# Patient Record
Sex: MALE | Race: WHITE | Employment: FULL TIME | URBAN - METROPOLITAN AREA
[De-identification: names, ages, dates, MRNs, and addresses within clinical notes are randomized per-mention and may not be internally consistent; named-entity substitution may affect disease eponyms.]

---

## 2018-03-14 VITALS
HEART RATE: 78 BPM | SYSTOLIC BLOOD PRESSURE: 130 MMHG | DIASTOLIC BLOOD PRESSURE: 74 MMHG | BODY MASS INDEX: 47.74 KG/M2 | HEIGHT: 68 IN | WEIGHT: 315 LBS | TEMPERATURE: 98.6 F

## 2018-03-14 RX ORDER — ATORVASTATIN CALCIUM 20 MG/1
20 TABLET, FILM COATED ORAL DAILY
COMMUNITY
End: 2018-03-16 | Stop reason: SDUPTHER

## 2018-03-16 ENCOUNTER — OFFICE VISIT (OUTPATIENT)
Dept: PRIMARY CARE CLINIC | Age: 48
End: 2018-03-16
Payer: COMMERCIAL

## 2018-03-16 VITALS
WEIGHT: 315 LBS | HEART RATE: 76 BPM | DIASTOLIC BLOOD PRESSURE: 84 MMHG | BODY MASS INDEX: 47.74 KG/M2 | TEMPERATURE: 96.8 F | SYSTOLIC BLOOD PRESSURE: 126 MMHG | HEIGHT: 68 IN

## 2018-03-16 DIAGNOSIS — K21.9 GASTROESOPHAGEAL REFLUX DISEASE, ESOPHAGITIS PRESENCE NOT SPECIFIED: ICD-10-CM

## 2018-03-16 DIAGNOSIS — E78.5 HYPERLIPIDEMIA, UNSPECIFIED HYPERLIPIDEMIA TYPE: Primary | ICD-10-CM

## 2018-03-16 DIAGNOSIS — F17.210 CIGARETTE SMOKER: ICD-10-CM

## 2018-03-16 DIAGNOSIS — E66.01 MORBID OBESITY (HCC): ICD-10-CM

## 2018-03-16 DIAGNOSIS — Z99.89 OSA ON CPAP: ICD-10-CM

## 2018-03-16 DIAGNOSIS — G47.33 OSA ON CPAP: ICD-10-CM

## 2018-03-16 PROCEDURE — 99213 OFFICE O/P EST LOW 20 MIN: CPT | Performed by: INTERNAL MEDICINE

## 2018-03-16 RX ORDER — ATORVASTATIN CALCIUM 20 MG/1
20 TABLET, FILM COATED ORAL DAILY
Qty: 90 TABLET | Refills: 1 | Status: SHIPPED | OUTPATIENT
Start: 2018-03-16 | End: 2019-06-06 | Stop reason: SDUPTHER

## 2018-03-16 RX ORDER — PANTOPRAZOLE SODIUM 40 MG/1
40 TABLET, DELAYED RELEASE ORAL DAILY
COMMUNITY
End: 2018-03-16 | Stop reason: SDUPTHER

## 2018-03-16 RX ORDER — PANTOPRAZOLE SODIUM 40 MG/1
40 TABLET, DELAYED RELEASE ORAL DAILY
Qty: 90 TABLET | Refills: 1 | Status: SHIPPED | OUTPATIENT
Start: 2018-03-16 | End: 2019-06-06 | Stop reason: SDUPTHER

## 2018-03-16 ASSESSMENT — ENCOUNTER SYMPTOMS
BLOOD IN STOOL: 0
ABDOMINAL PAIN: 0
BLURRED VISION: 0
NAUSEA: 0
HEMOPTYSIS: 0
EYE DISCHARGE: 0
ORTHOPNEA: 0
SHORTNESS OF BREATH: 0

## 2018-03-16 ASSESSMENT — PATIENT HEALTH QUESTIONNAIRE - PHQ9
2. FEELING DOWN, DEPRESSED OR HOPELESS: 0
SUM OF ALL RESPONSES TO PHQ9 QUESTIONS 1 & 2: 0
1. LITTLE INTEREST OR PLEASURE IN DOING THINGS: 0
SUM OF ALL RESPONSES TO PHQ QUESTIONS 1-9: 0

## 2018-03-16 NOTE — PATIENT INSTRUCTIONS
Advise for lab test prior to next visit    Low salt, Low Carb diet an low fat diet  Continue medications as advised and take them regularly  Regular exercises as advised    Discussed natural and expected course of this diagnosis and need to alert me if symptoms do not follow expected course, or if any worse. Smoking cessation if applicable, discussed with patient.

## 2018-03-16 NOTE — PROGRESS NOTES
Chief Complaint   Patient presents with    Hyperlipidemia     Here for recheck on Hyperlipidemia. Reports feeling well. Has concerns over finding blood on pillow. Thinks it was was his Lt ear. Denies any pain or discomfort. HPI:  Patient is here for follow-up . Shen to see Dr Jose Snyder later today  Allergy and Medications are reviewed and updated. Past Medical History, Surgical History, and Family History has been reviewed and updated. Review of Systems:  Review of Systems   Constitutional: Negative for chills and fever. HENT: Negative for congestion and ear pain. Eyes: Negative for blurred vision and discharge. Respiratory: Negative for hemoptysis and shortness of breath (No new SOB). Cardiovascular: Negative for chest pain, orthopnea and leg swelling. Gastrointestinal: Negative for abdominal pain, blood in stool and nausea. Genitourinary: Negative for flank pain and hematuria. Musculoskeletal: Negative for myalgias and neck pain. Skin: Negative for rash. Neurological: Negative for dizziness, focal weakness and seizures. Endo/Heme/Allergies: Negative for polydipsia. Does not bruise/bleed easily. Psychiatric/Behavioral: Negative for depression, hallucinations and suicidal ideas. Vitals:    03/16/18 1132   BP: 126/84   Pulse: 76   Temp: 96.8 °F (36 °C)   TempSrc: Temporal   Weight: (!) 375 lb (170.1 kg)   Height: 5' 8\" (1.727 m)       Physical Exam   Constitutional: He is oriented to person, place, and time. He appears well-developed and well-nourished. HENT:   Head: Normocephalic and atraumatic. Mouth/Throat: Oropharynx is clear and moist.   Eyes: Conjunctivae are normal. Pupils are equal, round, and reactive to light. Neck: Neck supple. No JVD present. Cardiovascular: Normal rate and regular rhythm. Pulmonary/Chest: Effort normal and breath sounds normal. He has no rales. Abdominal: Soft. Bowel sounds are normal.   Musculoskeletal: Normal range of motion. Lymphadenopathy:     He has no cervical adenopathy. Neurological: He is alert and oriented to person, place, and time. Skin: Skin is warm and dry. Psychiatric: His behavior is normal.       Labs :    Lab Results   Component Value Date    WBC 10.1 05/30/2017    HGB 14.5 05/30/2017    HCT 44.2 05/30/2017     05/30/2017    CHOL 157 05/30/2017    TRIG 115 05/30/2017    HDL 36 05/30/2017    ALT 24 05/30/2017    AST 18 05/30/2017     05/30/2017    K 4.8 05/30/2017     05/30/2017    CREATININE 0.9 05/30/2017    BUN 11 05/30/2017    CO2 24 05/30/2017    TSH 1.180 05/30/2017    LABA1C 5.6 05/30/2017     Lab Results   Component Value Date    COLORU Yellow 05/30/2017    NITRU Negative 05/30/2017    GLUCOSEU Negative 05/30/2017    GLUCOSEU NEGATIVE 12/16/2011    KETUA Negative 05/30/2017    UROBILINOGEN 0.2 05/30/2017    BILIRUBINUR Negative 05/30/2017    BILIRUBINUR NEGATIVE 12/16/2011             ASSESSMENT     Patient Active Problem List    Diagnosis Date Noted    Hyperlipidemia 03/16/2018    GERD (gastroesophageal reflux disease) 03/16/2018    Morbid obesity ([facility] [address] 03/16/2018    JEFFREY on CPAP 03/16/2018        Diagnosis:     ICD-10-CM ICD-9-CM    1. Hyperlipidemia, unspecified hyperlipidemia type E78.5 272.4 Comprehensive Metabolic Panel, Fasting      Lipid, Fasting      TSH without Reflex      Urinalysis with Microscopic   2. Gastroesophageal reflux disease, esophagitis presence not specified K21.9 530.81 CBC Auto Differential      Urinalysis with Microscopic   3. JEFFREY on CPAP G47.33 327.23     Z99.89 V46.8    4. Morbid obesity ([address] E66.01 278.01    5.  Cigarette smoker F17.210 305.1        PLAN:     Smoking Cessation       Patient Instructions   Advise for lab test prior to next visit    Low salt, Low Carb diet an low fat diet  Continue medications as advised and take them regularly  Regular exercises as advised    Discussed natural and expected course of this diagnosis and need to alert me if

## 2018-11-01 ENCOUNTER — HOSPITAL ENCOUNTER (OUTPATIENT)
Age: 48
Discharge: HOME | End: 2018-11-01
Payer: COMMERCIAL

## 2018-11-01 ENCOUNTER — HOSPITAL ENCOUNTER (OUTPATIENT)
Dept: GENERAL RADIOLOGY | Age: 48
Discharge: HOME | End: 2018-11-03
Payer: COMMERCIAL

## 2018-11-01 ENCOUNTER — HOSPITAL ENCOUNTER (OUTPATIENT)
Age: 48
Discharge: HOME | End: 2018-11-03
Payer: COMMERCIAL

## 2018-11-01 DIAGNOSIS — R06.09 DYSPNEA ON EXERTION: ICD-10-CM

## 2018-11-01 LAB
ALBUMIN SERPL-MCNC: 4.1 G/DL (ref 3.5–5.2)
ALP BLD-CCNC: 66 U/L (ref 40–129)
ALT SERPL-CCNC: 22 U/L (ref 0–40)
ANION GAP SERPL CALCULATED.3IONS-SCNC: 9 MMOL/L (ref 7–16)
AST SERPL-CCNC: 18 U/L (ref 0–39)
BILIRUB SERPL-MCNC: 0.4 MG/DL (ref 0–1.2)
BUN BLDV-MCNC: 12 MG/DL (ref 6–20)
C-REACTIVE PROTEIN, HIGH SENSITIVITY: 5.8 MG/L (ref 0–3)
CALCIUM SERPL-MCNC: 9.5 MG/DL (ref 8.6–10.2)
CHLORIDE BLD-SCNC: 104 MMOL/L (ref 98–107)
CHOLESTEROL, FASTING: 159 MG/DL (ref 0–199)
CO2: 28 MMOL/L (ref 22–29)
CREAT SERPL-MCNC: 1 MG/DL (ref 0.7–1.2)
GFR AFRICAN AMERICAN: >60
GFR NON-AFRICAN AMERICAN: >60 ML/MIN/1.73
GLUCOSE FASTING: 107 MG/DL (ref 74–109)
HCT VFR BLD CALC: 42.4 % (ref 37–54)
HDLC SERPL-MCNC: 41 MG/DL
HEMOGLOBIN: 14.4 G/DL (ref 12.5–16.5)
LDL CHOLESTEROL CALCULATED: 98 MG/DL (ref 0–99)
MCH RBC QN AUTO: 30 PG (ref 26–35)
MCHC RBC AUTO-ENTMCNC: 34 % (ref 32–34.5)
MCV RBC AUTO: 88.3 FL (ref 80–99.9)
PDW BLD-RTO: 12.5 FL (ref 11.5–15)
PLATELET # BLD: 196 E9/L (ref 130–450)
PMV BLD AUTO: 10.6 FL (ref 7–12)
POTASSIUM SERPL-SCNC: 4.7 MMOL/L (ref 3.5–5)
RBC # BLD: 4.8 E12/L (ref 3.8–5.8)
SODIUM BLD-SCNC: 141 MMOL/L (ref 132–146)
T3 UPTAKE PERCENT: 33.2 % (ref 22.5–37)
T4 TOTAL: 6.9 MCG/DL (ref 4.5–11.7)
TOTAL PROTEIN: 7.3 G/DL (ref 6.4–8.3)
TRIGLYCERIDE, FASTING: 99 MG/DL (ref 0–149)
TSH SERPL DL<=0.05 MIU/L-ACNC: 2.56 UIU/ML (ref 0.27–4.2)
VLDLC SERPL CALC-MCNC: 20 MG/DL
WBC # BLD: 10.5 E9/L (ref 4.5–11.5)

## 2018-11-01 PROCEDURE — 80053 COMPREHEN METABOLIC PANEL: CPT

## 2018-11-01 PROCEDURE — 84443 ASSAY THYROID STIM HORMONE: CPT

## 2018-11-01 PROCEDURE — 86141 C-REACTIVE PROTEIN HS: CPT

## 2018-11-01 PROCEDURE — 84479 ASSAY OF THYROID (T3 OR T4): CPT

## 2018-11-01 PROCEDURE — 36415 COLL VENOUS BLD VENIPUNCTURE: CPT

## 2018-11-01 PROCEDURE — 84436 ASSAY OF TOTAL THYROXINE: CPT

## 2018-11-01 PROCEDURE — 71046 X-RAY EXAM CHEST 2 VIEWS: CPT

## 2018-11-01 PROCEDURE — 80061 LIPID PANEL: CPT

## 2018-11-01 PROCEDURE — 85027 COMPLETE CBC AUTOMATED: CPT

## 2018-12-28 ENCOUNTER — OFFICE VISIT (OUTPATIENT)
Dept: PRIMARY CARE CLINIC | Age: 48
End: 2018-12-28
Payer: COMMERCIAL

## 2018-12-28 VITALS
WEIGHT: 315 LBS | OXYGEN SATURATION: 98 % | HEART RATE: 80 BPM | TEMPERATURE: 98.7 F | HEIGHT: 68 IN | BODY MASS INDEX: 47.74 KG/M2 | DIASTOLIC BLOOD PRESSURE: 76 MMHG | SYSTOLIC BLOOD PRESSURE: 134 MMHG

## 2018-12-28 DIAGNOSIS — E78.5 HYPERLIPIDEMIA, UNSPECIFIED HYPERLIPIDEMIA TYPE: Primary | ICD-10-CM

## 2018-12-28 DIAGNOSIS — E66.01 MORBID OBESITY (HCC): ICD-10-CM

## 2018-12-28 DIAGNOSIS — K21.9 GASTROESOPHAGEAL REFLUX DISEASE, ESOPHAGITIS PRESENCE NOT SPECIFIED: ICD-10-CM

## 2018-12-28 DIAGNOSIS — M54.40 MIDLINE LOW BACK PAIN WITH SCIATICA, SCIATICA LATERALITY UNSPECIFIED, UNSPECIFIED CHRONICITY: ICD-10-CM

## 2018-12-28 PROCEDURE — G8484 FLU IMMUNIZE NO ADMIN: HCPCS | Performed by: FAMILY MEDICINE

## 2018-12-28 PROCEDURE — 99213 OFFICE O/P EST LOW 20 MIN: CPT | Performed by: FAMILY MEDICINE

## 2018-12-28 PROCEDURE — 4004F PT TOBACCO SCREEN RCVD TLK: CPT | Performed by: FAMILY MEDICINE

## 2018-12-28 PROCEDURE — G8427 DOCREV CUR MEDS BY ELIG CLIN: HCPCS | Performed by: FAMILY MEDICINE

## 2018-12-28 PROCEDURE — G8417 CALC BMI ABV UP PARAM F/U: HCPCS | Performed by: FAMILY MEDICINE

## 2018-12-28 RX ORDER — CYCLOBENZAPRINE HCL 10 MG
10 TABLET ORAL NIGHTLY PRN
Qty: 30 TABLET | Refills: 0 | Status: SHIPPED | OUTPATIENT
Start: 2018-12-28 | End: 2019-01-07

## 2018-12-28 RX ORDER — NAPROXEN 500 MG/1
500 TABLET ORAL 2 TIMES DAILY WITH MEALS
Qty: 60 TABLET | Refills: 3 | Status: ON HOLD | OUTPATIENT
Start: 2018-12-28 | End: 2019-02-15 | Stop reason: HOSPADM

## 2018-12-28 ASSESSMENT — ENCOUNTER SYMPTOMS
GASTROINTESTINAL NEGATIVE: 1
ALLERGIC/IMMUNOLOGIC NEGATIVE: 1
EYES NEGATIVE: 1
RESPIRATORY NEGATIVE: 1
BACK PAIN: 1

## 2018-12-28 ASSESSMENT — PATIENT HEALTH QUESTIONNAIRE - PHQ9
1. LITTLE INTEREST OR PLEASURE IN DOING THINGS: 0
SUM OF ALL RESPONSES TO PHQ9 QUESTIONS 1 & 2: 0
SUM OF ALL RESPONSES TO PHQ QUESTIONS 1-9: 0
SUM OF ALL RESPONSES TO PHQ QUESTIONS 1-9: 0
2. FEELING DOWN, DEPRESSED OR HOPELESS: 0

## 2018-12-29 ENCOUNTER — HOSPITAL ENCOUNTER (OUTPATIENT)
Dept: GENERAL RADIOLOGY | Age: 48
Discharge: HOME | End: 2018-12-31
Payer: COMMERCIAL

## 2018-12-29 ENCOUNTER — HOSPITAL ENCOUNTER (OUTPATIENT)
Age: 48
Discharge: HOME | End: 2018-12-31
Payer: COMMERCIAL

## 2018-12-29 DIAGNOSIS — M54.40 MIDLINE LOW BACK PAIN WITH SCIATICA, SCIATICA LATERALITY UNSPECIFIED, UNSPECIFIED CHRONICITY: ICD-10-CM

## 2018-12-29 PROCEDURE — 72100 X-RAY EXAM L-S SPINE 2/3 VWS: CPT

## 2019-01-04 ENCOUNTER — OFFICE VISIT (OUTPATIENT)
Dept: PRIMARY CARE CLINIC | Age: 49
End: 2019-01-04
Payer: COMMERCIAL

## 2019-01-04 VITALS
WEIGHT: 315 LBS | SYSTOLIC BLOOD PRESSURE: 152 MMHG | HEART RATE: 78 BPM | OXYGEN SATURATION: 98 % | TEMPERATURE: 97.9 F | HEIGHT: 68 IN | BODY MASS INDEX: 47.74 KG/M2 | DIASTOLIC BLOOD PRESSURE: 84 MMHG

## 2019-01-04 DIAGNOSIS — F17.210 CIGARETTE SMOKER: ICD-10-CM

## 2019-01-04 DIAGNOSIS — E66.01 MORBID OBESITY (HCC): ICD-10-CM

## 2019-01-04 DIAGNOSIS — E78.5 HYPERLIPIDEMIA, UNSPECIFIED HYPERLIPIDEMIA TYPE: Primary | ICD-10-CM

## 2019-01-04 DIAGNOSIS — M54.40 MIDLINE LOW BACK PAIN WITH SCIATICA, SCIATICA LATERALITY UNSPECIFIED, UNSPECIFIED CHRONICITY: ICD-10-CM

## 2019-01-04 PROCEDURE — G8427 DOCREV CUR MEDS BY ELIG CLIN: HCPCS | Performed by: FAMILY MEDICINE

## 2019-01-04 PROCEDURE — G8484 FLU IMMUNIZE NO ADMIN: HCPCS | Performed by: FAMILY MEDICINE

## 2019-01-04 PROCEDURE — 4004F PT TOBACCO SCREEN RCVD TLK: CPT | Performed by: FAMILY MEDICINE

## 2019-01-04 PROCEDURE — G8417 CALC BMI ABV UP PARAM F/U: HCPCS | Performed by: FAMILY MEDICINE

## 2019-01-04 PROCEDURE — 99213 OFFICE O/P EST LOW 20 MIN: CPT | Performed by: FAMILY MEDICINE

## 2019-01-04 ASSESSMENT — PATIENT HEALTH QUESTIONNAIRE - PHQ9
1. LITTLE INTEREST OR PLEASURE IN DOING THINGS: 0
SUM OF ALL RESPONSES TO PHQ QUESTIONS 1-9: 0
SUM OF ALL RESPONSES TO PHQ9 QUESTIONS 1 & 2: 0
SUM OF ALL RESPONSES TO PHQ QUESTIONS 1-9: 0
2. FEELING DOWN, DEPRESSED OR HOPELESS: 0

## 2019-01-04 ASSESSMENT — ENCOUNTER SYMPTOMS
ALLERGIC/IMMUNOLOGIC NEGATIVE: 1
RESPIRATORY NEGATIVE: 1
BACK PAIN: 1
GASTROINTESTINAL NEGATIVE: 1
EYES NEGATIVE: 1

## 2019-01-17 ENCOUNTER — HOSPITAL ENCOUNTER (OUTPATIENT)
Dept: PHYSICAL THERAPY | Age: 49
Setting detail: THERAPIES SERIES
Discharge: HOME | End: 2019-01-17
Payer: COMMERCIAL

## 2019-01-17 PROCEDURE — G8978 MOBILITY CURRENT STATUS: HCPCS | Performed by: PHYSICAL THERAPIST

## 2019-01-17 PROCEDURE — G8979 MOBILITY GOAL STATUS: HCPCS | Performed by: PHYSICAL THERAPIST

## 2019-01-17 PROCEDURE — 97162 PT EVAL MOD COMPLEX 30 MIN: CPT | Performed by: PHYSICAL THERAPIST

## 2019-01-20 ENCOUNTER — HOSPITAL ENCOUNTER (EMERGENCY)
Age: 49
Discharge: OTHER INSTITUTION | End: 2019-01-20
Payer: COMMERCIAL

## 2019-01-20 ENCOUNTER — HOSPITAL ENCOUNTER (OUTPATIENT)
Age: 49
Discharge: HOME | End: 2019-01-20
Payer: COMMERCIAL

## 2019-01-20 ENCOUNTER — HOSPITAL ENCOUNTER (INPATIENT)
Age: 49
LOS: 4 days | Discharge: NURSING, REHAB OR LONG-TERM CARE | DRG: 552 | End: 2019-01-24
Attending: STUDENT IN AN ORGANIZED HEALTH CARE EDUCATION/TRAINING PROGRAM | Admitting: STUDENT IN AN ORGANIZED HEALTH CARE EDUCATION/TRAINING PROGRAM
Payer: COMMERCIAL

## 2019-01-20 ENCOUNTER — APPOINTMENT (OUTPATIENT)
Dept: CT IMAGING | Age: 49
End: 2019-01-20
Payer: COMMERCIAL

## 2019-01-20 VITALS
DIASTOLIC BLOOD PRESSURE: 74 MMHG | OXYGEN SATURATION: 98 % | HEART RATE: 76 BPM | RESPIRATION RATE: 16 BRPM | BODY MASS INDEX: 47.74 KG/M2 | TEMPERATURE: 98.3 F | SYSTOLIC BLOOD PRESSURE: 138 MMHG | WEIGHT: 315 LBS | HEIGHT: 68 IN

## 2019-01-20 DIAGNOSIS — G83.4 CAUDA EQUINA SYNDROME (HCC): Primary | ICD-10-CM

## 2019-01-20 PROCEDURE — 72131 CT LUMBAR SPINE W/O DYE: CPT

## 2019-01-20 PROCEDURE — A0425 GROUND MILEAGE: HCPCS

## 2019-01-20 PROCEDURE — 6360000002 HC RX W HCPCS: Performed by: NURSE PRACTITIONER

## 2019-01-20 PROCEDURE — 99284 EMERGENCY DEPT VISIT MOD MDM: CPT

## 2019-01-20 PROCEDURE — 6360000002 HC RX W HCPCS: Performed by: INTERNAL MEDICINE

## 2019-01-20 PROCEDURE — A0426 ALS 1: HCPCS

## 2019-01-20 PROCEDURE — 6370000000 HC RX 637 (ALT 250 FOR IP): Performed by: INTERNAL MEDICINE

## 2019-01-20 PROCEDURE — 2060000000 HC ICU INTERMEDIATE R&B

## 2019-01-20 PROCEDURE — 96372 THER/PROPH/DIAG INJ SC/IM: CPT

## 2019-01-20 RX ORDER — ATORVASTATIN CALCIUM 10 MG/1
10 TABLET, FILM COATED ORAL NIGHTLY
Status: DISCONTINUED | OUTPATIENT
Start: 2019-01-20 | End: 2019-01-24 | Stop reason: HOSPADM

## 2019-01-20 RX ORDER — ONDANSETRON 2 MG/ML
4 INJECTION INTRAMUSCULAR; INTRAVENOUS EVERY 6 HOURS PRN
Status: DISCONTINUED | OUTPATIENT
Start: 2019-01-20 | End: 2019-01-24 | Stop reason: HOSPADM

## 2019-01-20 RX ORDER — PANTOPRAZOLE SODIUM 40 MG/1
40 TABLET, DELAYED RELEASE ORAL NIGHTLY
Status: DISCONTINUED | OUTPATIENT
Start: 2019-01-20 | End: 2019-01-24 | Stop reason: HOSPADM

## 2019-01-20 RX ORDER — CYCLOBENZAPRINE HCL 10 MG
10 TABLET ORAL DAILY PRN
Status: ON HOLD | COMMUNITY
End: 2019-02-15 | Stop reason: HOSPADM

## 2019-01-20 RX ORDER — SODIUM CHLORIDE 0.9 % (FLUSH) 0.9 %
10 SYRINGE (ML) INJECTION PRN
Status: DISCONTINUED | OUTPATIENT
Start: 2019-01-20 | End: 2019-01-24 | Stop reason: HOSPADM

## 2019-01-20 RX ORDER — ACETAMINOPHEN 325 MG/1
650 TABLET ORAL EVERY 4 HOURS PRN
Status: DISCONTINUED | OUTPATIENT
Start: 2019-01-20 | End: 2019-01-24 | Stop reason: HOSPADM

## 2019-01-20 RX ORDER — DEXAMETHASONE SODIUM PHOSPHATE 10 MG/ML
10 INJECTION INTRAMUSCULAR; INTRAVENOUS ONCE
Status: COMPLETED | OUTPATIENT
Start: 2019-01-20 | End: 2019-01-20

## 2019-01-20 RX ORDER — ORPHENADRINE CITRATE 30 MG/ML
60 INJECTION INTRAMUSCULAR; INTRAVENOUS ONCE
Status: COMPLETED | OUTPATIENT
Start: 2019-01-20 | End: 2019-01-20

## 2019-01-20 RX ORDER — KETOROLAC TROMETHAMINE 30 MG/ML
30 INJECTION, SOLUTION INTRAMUSCULAR; INTRAVENOUS ONCE
Status: COMPLETED | OUTPATIENT
Start: 2019-01-20 | End: 2019-01-20

## 2019-01-20 RX ORDER — DOCUSATE SODIUM 100 MG/1
100 CAPSULE, LIQUID FILLED ORAL DAILY
Status: DISCONTINUED | OUTPATIENT
Start: 2019-01-20 | End: 2019-01-24 | Stop reason: HOSPADM

## 2019-01-20 RX ORDER — CYCLOBENZAPRINE HCL 10 MG
10 TABLET ORAL 2 TIMES DAILY PRN
Status: DISCONTINUED | OUTPATIENT
Start: 2019-01-20 | End: 2019-01-24 | Stop reason: HOSPADM

## 2019-01-20 RX ORDER — SODIUM CHLORIDE 0.9 % (FLUSH) 0.9 %
10 SYRINGE (ML) INJECTION EVERY 12 HOURS SCHEDULED
Status: DISCONTINUED | OUTPATIENT
Start: 2019-01-20 | End: 2019-01-24 | Stop reason: HOSPADM

## 2019-01-20 RX ADMIN — ORPHENADRINE CITRATE 60 MG: 60 INJECTION INTRAMUSCULAR; INTRAVENOUS at 13:41

## 2019-01-20 RX ADMIN — DOCUSATE SODIUM 100 MG: 100 CAPSULE, LIQUID FILLED ORAL at 21:29

## 2019-01-20 RX ADMIN — KETOROLAC TROMETHAMINE 30 MG: 30 INJECTION, SOLUTION INTRAMUSCULAR; INTRAVENOUS at 13:39

## 2019-01-20 RX ADMIN — PANTOPRAZOLE SODIUM 40 MG: 40 TABLET, DELAYED RELEASE ORAL at 23:19

## 2019-01-20 RX ADMIN — ATORVASTATIN CALCIUM 10 MG: 10 TABLET, FILM COATED ORAL at 23:19

## 2019-01-20 RX ADMIN — DEXAMETHASONE SODIUM PHOSPHATE 10 MG: 10 INJECTION INTRAMUSCULAR; INTRAVENOUS at 13:40

## 2019-01-20 RX ADMIN — ENOXAPARIN SODIUM 40 MG: 40 INJECTION SUBCUTANEOUS at 21:29

## 2019-01-20 ASSESSMENT — PAIN DESCRIPTION - PAIN TYPE
TYPE: ACUTE PAIN;CHRONIC PAIN
TYPE: ACUTE PAIN;CHRONIC PAIN

## 2019-01-20 ASSESSMENT — PAIN SCALES - GENERAL
PAINLEVEL_OUTOF10: 7
PAINLEVEL_OUTOF10: 7
PAINLEVEL_OUTOF10: 5
PAINLEVEL_OUTOF10: 5

## 2019-01-20 ASSESSMENT — PAIN DESCRIPTION - LOCATION: LOCATION: COCCYX

## 2019-01-21 ENCOUNTER — APPOINTMENT (OUTPATIENT)
Dept: MRI IMAGING | Age: 49
DRG: 552 | End: 2019-01-21
Attending: STUDENT IN AN ORGANIZED HEALTH CARE EDUCATION/TRAINING PROGRAM
Payer: COMMERCIAL

## 2019-01-21 ENCOUNTER — HOSPITAL ENCOUNTER (OUTPATIENT)
Dept: PHYSICAL THERAPY | Age: 49
Setting detail: THERAPIES SERIES
Discharge: HOME | End: 2019-01-21
Payer: COMMERCIAL

## 2019-01-21 LAB
ALBUMIN SERPL-MCNC: 4.1 G/DL (ref 3.5–5.2)
ALP BLD-CCNC: 58 U/L (ref 40–129)
ALT SERPL-CCNC: 18 U/L (ref 0–40)
ANION GAP SERPL CALCULATED.3IONS-SCNC: 15 MMOL/L (ref 7–16)
AST SERPL-CCNC: 19 U/L (ref 0–39)
BASOPHILS ABSOLUTE: 0.01 E9/L (ref 0–0.2)
BASOPHILS RELATIVE PERCENT: 0.1 % (ref 0–2)
BILIRUB SERPL-MCNC: 0.4 MG/DL (ref 0–1.2)
BILIRUBIN DIRECT: <0.2 MG/DL (ref 0–0.3)
BILIRUBIN, INDIRECT: NORMAL MG/DL (ref 0–1)
BUN BLDV-MCNC: 14 MG/DL (ref 6–20)
CALCIUM SERPL-MCNC: 9.6 MG/DL (ref 8.6–10.2)
CHLORIDE BLD-SCNC: 102 MMOL/L (ref 98–107)
CO2: 22 MMOL/L (ref 22–29)
CREAT SERPL-MCNC: 0.8 MG/DL (ref 0.7–1.2)
EOSINOPHILS ABSOLUTE: 0 E9/L (ref 0.05–0.5)
EOSINOPHILS RELATIVE PERCENT: 0 % (ref 0–6)
GFR AFRICAN AMERICAN: >60
GFR NON-AFRICAN AMERICAN: >60 ML/MIN/1.73
GLUCOSE BLD-MCNC: 147 MG/DL (ref 74–99)
HCT VFR BLD CALC: 41.8 % (ref 37–54)
HEMOGLOBIN: 14.2 G/DL (ref 12.5–16.5)
IMMATURE GRANULOCYTES #: 0.09 E9/L
IMMATURE GRANULOCYTES %: 0.7 % (ref 0–5)
LYMPHOCYTES ABSOLUTE: 0.91 E9/L (ref 1.5–4)
LYMPHOCYTES RELATIVE PERCENT: 7.6 % (ref 20–42)
MCH RBC QN AUTO: 29.3 PG (ref 26–35)
MCHC RBC AUTO-ENTMCNC: 34 % (ref 32–34.5)
MCV RBC AUTO: 86.4 FL (ref 80–99.9)
MONOCYTES ABSOLUTE: 0.55 E9/L (ref 0.1–0.95)
MONOCYTES RELATIVE PERCENT: 4.6 % (ref 2–12)
NEUTROPHILS ABSOLUTE: 10.46 E9/L (ref 1.8–7.3)
NEUTROPHILS RELATIVE PERCENT: 87 % (ref 43–80)
PDW BLD-RTO: 12.3 FL (ref 11.5–15)
PLATELET # BLD: 216 E9/L (ref 130–450)
PMV BLD AUTO: 10.8 FL (ref 7–12)
POTASSIUM SERPL-SCNC: 4.3 MMOL/L (ref 3.5–5)
RBC # BLD: 4.84 E12/L (ref 3.8–5.8)
SODIUM BLD-SCNC: 139 MMOL/L (ref 132–146)
TOTAL PROTEIN: 7.1 G/DL (ref 6.4–8.3)
WBC # BLD: 12 E9/L (ref 4.5–11.5)

## 2019-01-21 PROCEDURE — 99222 1ST HOSP IP/OBS MODERATE 55: CPT | Performed by: NEUROLOGICAL SURGERY

## 2019-01-21 PROCEDURE — 6360000004 HC RX CONTRAST MEDICATION: Performed by: RADIOLOGY

## 2019-01-21 PROCEDURE — 36415 COLL VENOUS BLD VENIPUNCTURE: CPT

## 2019-01-21 PROCEDURE — 6370000000 HC RX 637 (ALT 250 FOR IP): Performed by: FAMILY MEDICINE

## 2019-01-21 PROCEDURE — 72141 MRI NECK SPINE W/O DYE: CPT

## 2019-01-21 PROCEDURE — 85025 COMPLETE CBC W/AUTO DIFF WBC: CPT

## 2019-01-21 PROCEDURE — 2060000000 HC ICU INTERMEDIATE R&B

## 2019-01-21 PROCEDURE — A9579 GAD-BASE MR CONTRAST NOS,1ML: HCPCS | Performed by: RADIOLOGY

## 2019-01-21 PROCEDURE — 2580000003 HC RX 258: Performed by: INTERNAL MEDICINE

## 2019-01-21 PROCEDURE — 80076 HEPATIC FUNCTION PANEL: CPT

## 2019-01-21 PROCEDURE — 80048 BASIC METABOLIC PNL TOTAL CA: CPT

## 2019-01-21 PROCEDURE — 6370000000 HC RX 637 (ALT 250 FOR IP): Performed by: INTERNAL MEDICINE

## 2019-01-21 PROCEDURE — 72158 MRI LUMBAR SPINE W/O & W/DYE: CPT

## 2019-01-21 RX ORDER — NAPROXEN 500 MG/1
500 TABLET ORAL 2 TIMES DAILY WITH MEALS
Status: DISCONTINUED | OUTPATIENT
Start: 2019-01-21 | End: 2019-01-24 | Stop reason: HOSPADM

## 2019-01-21 RX ADMIN — CYCLOBENZAPRINE 10 MG: 10 TABLET, FILM COATED ORAL at 09:03

## 2019-01-21 RX ADMIN — PANTOPRAZOLE SODIUM 40 MG: 40 TABLET, DELAYED RELEASE ORAL at 20:26

## 2019-01-21 RX ADMIN — NAPROXEN 500 MG: 500 TABLET ORAL at 18:32

## 2019-01-21 RX ADMIN — ATORVASTATIN CALCIUM 10 MG: 10 TABLET, FILM COATED ORAL at 20:26

## 2019-01-21 RX ADMIN — CYCLOBENZAPRINE 10 MG: 10 TABLET, FILM COATED ORAL at 20:26

## 2019-01-21 RX ADMIN — DOCUSATE SODIUM 100 MG: 100 CAPSULE, LIQUID FILLED ORAL at 09:04

## 2019-01-21 RX ADMIN — Medication 10 ML: at 09:05

## 2019-01-21 RX ADMIN — GADOTERIDOL 20 ML: 279.3 INJECTION, SOLUTION INTRAVENOUS at 13:20

## 2019-01-21 ASSESSMENT — PAIN DESCRIPTION - DESCRIPTORS: DESCRIPTORS: ACHING;SORE;DULL

## 2019-01-21 ASSESSMENT — PAIN SCALES - GENERAL
PAINLEVEL_OUTOF10: 5
PAINLEVEL_OUTOF10: 0
PAINLEVEL_OUTOF10: 7
PAINLEVEL_OUTOF10: 0

## 2019-01-21 ASSESSMENT — PAIN DESCRIPTION - LOCATION: LOCATION: BACK

## 2019-01-21 ASSESSMENT — PAIN DESCRIPTION - PROGRESSION: CLINICAL_PROGRESSION: NOT CHANGED

## 2019-01-21 ASSESSMENT — PAIN DESCRIPTION - FREQUENCY: FREQUENCY: CONTINUOUS

## 2019-01-21 ASSESSMENT — PAIN DESCRIPTION - PAIN TYPE: TYPE: ACUTE PAIN

## 2019-01-21 ASSESSMENT — PAIN DESCRIPTION - ONSET: ONSET: ON-GOING

## 2019-01-22 PROBLEM — M48.061 SPINAL STENOSIS OF LUMBAR REGION WITHOUT NEUROGENIC CLAUDICATION: Status: ACTIVE | Noted: 2019-01-22

## 2019-01-22 LAB
ANION GAP SERPL CALCULATED.3IONS-SCNC: 13 MMOL/L (ref 7–16)
BASOPHILS ABSOLUTE: 0.03 E9/L (ref 0–0.2)
BASOPHILS RELATIVE PERCENT: 0.2 % (ref 0–2)
BUN BLDV-MCNC: 16 MG/DL (ref 6–20)
CALCIUM SERPL-MCNC: 9.2 MG/DL (ref 8.6–10.2)
CHLORIDE BLD-SCNC: 103 MMOL/L (ref 98–107)
CO2: 24 MMOL/L (ref 22–29)
CREAT SERPL-MCNC: 0.9 MG/DL (ref 0.7–1.2)
EOSINOPHILS ABSOLUTE: 0.06 E9/L (ref 0.05–0.5)
EOSINOPHILS RELATIVE PERCENT: 0.4 % (ref 0–6)
GFR AFRICAN AMERICAN: >60
GFR NON-AFRICAN AMERICAN: >60 ML/MIN/1.73
GLUCOSE BLD-MCNC: 100 MG/DL (ref 74–99)
HCT VFR BLD CALC: 42.3 % (ref 37–54)
HEMOGLOBIN: 14.2 G/DL (ref 12.5–16.5)
IMMATURE GRANULOCYTES #: 0.08 E9/L
IMMATURE GRANULOCYTES %: 0.6 % (ref 0–5)
LYMPHOCYTES ABSOLUTE: 3.07 E9/L (ref 1.5–4)
LYMPHOCYTES RELATIVE PERCENT: 22.5 % (ref 20–42)
MCH RBC QN AUTO: 29.3 PG (ref 26–35)
MCHC RBC AUTO-ENTMCNC: 33.6 % (ref 32–34.5)
MCV RBC AUTO: 87.4 FL (ref 80–99.9)
MONOCYTES ABSOLUTE: 1.08 E9/L (ref 0.1–0.95)
MONOCYTES RELATIVE PERCENT: 7.9 % (ref 2–12)
NEUTROPHILS ABSOLUTE: 9.32 E9/L (ref 1.8–7.3)
NEUTROPHILS RELATIVE PERCENT: 68.4 % (ref 43–80)
PDW BLD-RTO: 12.9 FL (ref 11.5–15)
PLATELET # BLD: 208 E9/L (ref 130–450)
PMV BLD AUTO: 11.1 FL (ref 7–12)
POTASSIUM SERPL-SCNC: 4.1 MMOL/L (ref 3.5–5)
PROCALCITONIN: 0.06 NG/ML (ref 0–0.08)
RBC # BLD: 4.84 E12/L (ref 3.8–5.8)
SODIUM BLD-SCNC: 140 MMOL/L (ref 132–146)
WBC # BLD: 13.6 E9/L (ref 4.5–11.5)

## 2019-01-22 PROCEDURE — 6370000000 HC RX 637 (ALT 250 FOR IP): Performed by: FAMILY MEDICINE

## 2019-01-22 PROCEDURE — 97530 THERAPEUTIC ACTIVITIES: CPT

## 2019-01-22 PROCEDURE — 97166 OT EVAL MOD COMPLEX 45 MIN: CPT

## 2019-01-22 PROCEDURE — 97162 PT EVAL MOD COMPLEX 30 MIN: CPT | Performed by: PHYSICAL THERAPIST

## 2019-01-22 PROCEDURE — 99232 SBSQ HOSP IP/OBS MODERATE 35: CPT | Performed by: NEUROLOGICAL SURGERY

## 2019-01-22 PROCEDURE — 6360000002 HC RX W HCPCS: Performed by: INTERNAL MEDICINE

## 2019-01-22 PROCEDURE — 85025 COMPLETE CBC W/AUTO DIFF WBC: CPT

## 2019-01-22 PROCEDURE — 36415 COLL VENOUS BLD VENIPUNCTURE: CPT

## 2019-01-22 PROCEDURE — 2060000000 HC ICU INTERMEDIATE R&B

## 2019-01-22 PROCEDURE — 84145 PROCALCITONIN (PCT): CPT

## 2019-01-22 PROCEDURE — 2580000003 HC RX 258: Performed by: INTERNAL MEDICINE

## 2019-01-22 PROCEDURE — 6370000000 HC RX 637 (ALT 250 FOR IP): Performed by: INTERNAL MEDICINE

## 2019-01-22 PROCEDURE — 80048 BASIC METABOLIC PNL TOTAL CA: CPT

## 2019-01-22 PROCEDURE — 97530 THERAPEUTIC ACTIVITIES: CPT | Performed by: PHYSICAL THERAPIST

## 2019-01-22 RX ADMIN — NAPROXEN 500 MG: 500 TABLET ORAL at 07:26

## 2019-01-22 RX ADMIN — NAPROXEN 500 MG: 500 TABLET ORAL at 16:41

## 2019-01-22 RX ADMIN — CYCLOBENZAPRINE 10 MG: 10 TABLET, FILM COATED ORAL at 20:04

## 2019-01-22 RX ADMIN — ENOXAPARIN SODIUM 40 MG: 40 INJECTION SUBCUTANEOUS at 10:39

## 2019-01-22 RX ADMIN — CYCLOBENZAPRINE 10 MG: 10 TABLET, FILM COATED ORAL at 07:26

## 2019-01-22 RX ADMIN — DOCUSATE SODIUM 100 MG: 100 CAPSULE, LIQUID FILLED ORAL at 07:26

## 2019-01-22 RX ADMIN — ACETAMINOPHEN 650 MG: 325 TABLET ORAL at 14:37

## 2019-01-22 RX ADMIN — ATORVASTATIN CALCIUM 10 MG: 10 TABLET, FILM COATED ORAL at 20:04

## 2019-01-22 RX ADMIN — Medication 10 ML: at 20:04

## 2019-01-22 RX ADMIN — PANTOPRAZOLE SODIUM 40 MG: 40 TABLET, DELAYED RELEASE ORAL at 20:04

## 2019-01-22 RX ADMIN — ACETAMINOPHEN 650 MG: 325 TABLET ORAL at 07:26

## 2019-01-22 RX ADMIN — Medication 10 ML: at 07:26

## 2019-01-22 ASSESSMENT — PAIN SCALES - GENERAL
PAINLEVEL_OUTOF10: 7
PAINLEVEL_OUTOF10: 6
PAINLEVEL_OUTOF10: 0
PAINLEVEL_OUTOF10: 6

## 2019-01-22 ASSESSMENT — PAIN DESCRIPTION - LOCATION: LOCATION: BACK

## 2019-01-22 ASSESSMENT — PAIN DESCRIPTION - DESCRIPTORS: DESCRIPTORS: ACHING;DISCOMFORT

## 2019-01-22 ASSESSMENT — PAIN DESCRIPTION - PAIN TYPE: TYPE: ACUTE PAIN

## 2019-01-23 LAB
ANION GAP SERPL CALCULATED.3IONS-SCNC: 12 MMOL/L (ref 7–16)
BASOPHILS ABSOLUTE: 0.05 E9/L (ref 0–0.2)
BASOPHILS RELATIVE PERCENT: 0.5 % (ref 0–2)
BUN BLDV-MCNC: 15 MG/DL (ref 6–20)
CALCIUM SERPL-MCNC: 9 MG/DL (ref 8.6–10.2)
CHLORIDE BLD-SCNC: 102 MMOL/L (ref 98–107)
CO2: 25 MMOL/L (ref 22–29)
CREAT SERPL-MCNC: 0.9 MG/DL (ref 0.7–1.2)
EOSINOPHILS ABSOLUTE: 0.17 E9/L (ref 0.05–0.5)
EOSINOPHILS RELATIVE PERCENT: 1.7 % (ref 0–6)
GFR AFRICAN AMERICAN: >60
GFR NON-AFRICAN AMERICAN: >60 ML/MIN/1.73
GLUCOSE BLD-MCNC: 100 MG/DL (ref 74–99)
HCT VFR BLD CALC: 40.8 % (ref 37–54)
HEMOGLOBIN: 13.7 G/DL (ref 12.5–16.5)
IMMATURE GRANULOCYTES #: 0.05 E9/L
IMMATURE GRANULOCYTES %: 0.5 % (ref 0–5)
LYMPHOCYTES ABSOLUTE: 3.71 E9/L (ref 1.5–4)
LYMPHOCYTES RELATIVE PERCENT: 36.2 % (ref 20–42)
MCH RBC QN AUTO: 29.8 PG (ref 26–35)
MCHC RBC AUTO-ENTMCNC: 33.6 % (ref 32–34.5)
MCV RBC AUTO: 88.9 FL (ref 80–99.9)
MONOCYTES ABSOLUTE: 0.91 E9/L (ref 0.1–0.95)
MONOCYTES RELATIVE PERCENT: 8.9 % (ref 2–12)
NEUTROPHILS ABSOLUTE: 5.36 E9/L (ref 1.8–7.3)
NEUTROPHILS RELATIVE PERCENT: 52.2 % (ref 43–80)
PDW BLD-RTO: 13 FL (ref 11.5–15)
PLATELET # BLD: 183 E9/L (ref 130–450)
PMV BLD AUTO: 10.9 FL (ref 7–12)
POTASSIUM SERPL-SCNC: 4.2 MMOL/L (ref 3.5–5)
RBC # BLD: 4.59 E12/L (ref 3.8–5.8)
SODIUM BLD-SCNC: 139 MMOL/L (ref 132–146)
WBC # BLD: 10.3 E9/L (ref 4.5–11.5)

## 2019-01-23 PROCEDURE — 6370000000 HC RX 637 (ALT 250 FOR IP): Performed by: INTERNAL MEDICINE

## 2019-01-23 PROCEDURE — 6370000000 HC RX 637 (ALT 250 FOR IP): Performed by: FAMILY MEDICINE

## 2019-01-23 PROCEDURE — 80048 BASIC METABOLIC PNL TOTAL CA: CPT

## 2019-01-23 PROCEDURE — 2580000003 HC RX 258: Performed by: INTERNAL MEDICINE

## 2019-01-23 PROCEDURE — 36415 COLL VENOUS BLD VENIPUNCTURE: CPT

## 2019-01-23 PROCEDURE — 97535 SELF CARE MNGMENT TRAINING: CPT

## 2019-01-23 PROCEDURE — 2060000000 HC ICU INTERMEDIATE R&B

## 2019-01-23 PROCEDURE — 85025 COMPLETE CBC W/AUTO DIFF WBC: CPT

## 2019-01-23 PROCEDURE — 97530 THERAPEUTIC ACTIVITIES: CPT

## 2019-01-23 PROCEDURE — 6360000002 HC RX W HCPCS: Performed by: INTERNAL MEDICINE

## 2019-01-23 RX ADMIN — ATORVASTATIN CALCIUM 10 MG: 10 TABLET, FILM COATED ORAL at 20:56

## 2019-01-23 RX ADMIN — Medication 10 ML: at 20:56

## 2019-01-23 RX ADMIN — ENOXAPARIN SODIUM 40 MG: 40 INJECTION SUBCUTANEOUS at 09:06

## 2019-01-23 RX ADMIN — PANTOPRAZOLE SODIUM 40 MG: 40 TABLET, DELAYED RELEASE ORAL at 20:56

## 2019-01-23 RX ADMIN — Medication 10 ML: at 09:06

## 2019-01-23 RX ADMIN — NAPROXEN 500 MG: 500 TABLET ORAL at 17:26

## 2019-01-23 RX ADMIN — CYCLOBENZAPRINE 10 MG: 10 TABLET, FILM COATED ORAL at 17:26

## 2019-01-23 RX ADMIN — NAPROXEN 500 MG: 500 TABLET ORAL at 09:06

## 2019-01-23 RX ADMIN — DOCUSATE SODIUM 100 MG: 100 CAPSULE, LIQUID FILLED ORAL at 09:06

## 2019-01-23 ASSESSMENT — PAIN DESCRIPTION - LOCATION: LOCATION: NECK

## 2019-01-23 ASSESSMENT — PAIN DESCRIPTION - FREQUENCY: FREQUENCY: CONTINUOUS

## 2019-01-23 ASSESSMENT — PAIN DESCRIPTION - DESCRIPTORS: DESCRIPTORS: ACHING;SORE;DISCOMFORT

## 2019-01-23 ASSESSMENT — PAIN SCALES - GENERAL
PAINLEVEL_OUTOF10: 5
PAINLEVEL_OUTOF10: 3
PAINLEVEL_OUTOF10: 0

## 2019-01-23 ASSESSMENT — PAIN DESCRIPTION - PAIN TYPE: TYPE: ACUTE PAIN

## 2019-01-23 ASSESSMENT — PAIN DESCRIPTION - ONSET: ONSET: ON-GOING

## 2019-01-23 ASSESSMENT — PAIN DESCRIPTION - PROGRESSION: CLINICAL_PROGRESSION: NOT CHANGED

## 2019-01-24 VITALS
TEMPERATURE: 98.1 F | HEIGHT: 68 IN | WEIGHT: 315 LBS | HEART RATE: 80 BPM | BODY MASS INDEX: 47.74 KG/M2 | RESPIRATION RATE: 18 BRPM | OXYGEN SATURATION: 94 % | DIASTOLIC BLOOD PRESSURE: 78 MMHG | SYSTOLIC BLOOD PRESSURE: 132 MMHG

## 2019-01-24 LAB
ANION GAP SERPL CALCULATED.3IONS-SCNC: 12 MMOL/L (ref 7–16)
BASOPHILS ABSOLUTE: 0.06 E9/L (ref 0–0.2)
BASOPHILS RELATIVE PERCENT: 0.6 % (ref 0–2)
BUN BLDV-MCNC: 16 MG/DL (ref 6–20)
CALCIUM SERPL-MCNC: 9.1 MG/DL (ref 8.6–10.2)
CHLORIDE BLD-SCNC: 102 MMOL/L (ref 98–107)
CO2: 25 MMOL/L (ref 22–29)
CREAT SERPL-MCNC: 1 MG/DL (ref 0.7–1.2)
EOSINOPHILS ABSOLUTE: 0.2 E9/L (ref 0.05–0.5)
EOSINOPHILS RELATIVE PERCENT: 1.9 % (ref 0–6)
GFR AFRICAN AMERICAN: >60
GFR NON-AFRICAN AMERICAN: >60 ML/MIN/1.73
GLUCOSE BLD-MCNC: 101 MG/DL (ref 74–99)
HCT VFR BLD CALC: 42.6 % (ref 37–54)
HEMOGLOBIN: 14.3 G/DL (ref 12.5–16.5)
IMMATURE GRANULOCYTES #: 0.04 E9/L
IMMATURE GRANULOCYTES %: 0.4 % (ref 0–5)
LYMPHOCYTES ABSOLUTE: 2.97 E9/L (ref 1.5–4)
LYMPHOCYTES RELATIVE PERCENT: 28.3 % (ref 20–42)
MCH RBC QN AUTO: 30 PG (ref 26–35)
MCHC RBC AUTO-ENTMCNC: 33.6 % (ref 32–34.5)
MCV RBC AUTO: 89.3 FL (ref 80–99.9)
MONOCYTES ABSOLUTE: 1 E9/L (ref 0.1–0.95)
MONOCYTES RELATIVE PERCENT: 9.5 % (ref 2–12)
NEUTROPHILS ABSOLUTE: 6.21 E9/L (ref 1.8–7.3)
NEUTROPHILS RELATIVE PERCENT: 59.3 % (ref 43–80)
PDW BLD-RTO: 12.7 FL (ref 11.5–15)
PLATELET # BLD: 189 E9/L (ref 130–450)
PMV BLD AUTO: 10.8 FL (ref 7–12)
POTASSIUM SERPL-SCNC: 4.6 MMOL/L (ref 3.5–5)
RBC # BLD: 4.77 E12/L (ref 3.8–5.8)
SODIUM BLD-SCNC: 139 MMOL/L (ref 132–146)
WBC # BLD: 10.5 E9/L (ref 4.5–11.5)

## 2019-01-24 PROCEDURE — 6370000000 HC RX 637 (ALT 250 FOR IP): Performed by: FAMILY MEDICINE

## 2019-01-24 PROCEDURE — 2580000003 HC RX 258: Performed by: INTERNAL MEDICINE

## 2019-01-24 PROCEDURE — 97530 THERAPEUTIC ACTIVITIES: CPT

## 2019-01-24 PROCEDURE — 97535 SELF CARE MNGMENT TRAINING: CPT

## 2019-01-24 PROCEDURE — 6370000000 HC RX 637 (ALT 250 FOR IP): Performed by: INTERNAL MEDICINE

## 2019-01-24 PROCEDURE — 85025 COMPLETE CBC W/AUTO DIFF WBC: CPT

## 2019-01-24 PROCEDURE — 80048 BASIC METABOLIC PNL TOTAL CA: CPT

## 2019-01-24 PROCEDURE — 36415 COLL VENOUS BLD VENIPUNCTURE: CPT

## 2019-01-24 PROCEDURE — 6360000002 HC RX W HCPCS: Performed by: INTERNAL MEDICINE

## 2019-01-24 RX ADMIN — ENOXAPARIN SODIUM 40 MG: 40 INJECTION SUBCUTANEOUS at 09:18

## 2019-01-24 RX ADMIN — NAPROXEN 500 MG: 500 TABLET ORAL at 09:17

## 2019-01-24 RX ADMIN — NAPROXEN 500 MG: 500 TABLET ORAL at 16:51

## 2019-01-24 RX ADMIN — NAPROXEN 500 MG: 500 TABLET ORAL at 16:50

## 2019-01-24 RX ADMIN — DOCUSATE SODIUM 100 MG: 100 CAPSULE, LIQUID FILLED ORAL at 09:17

## 2019-01-24 RX ADMIN — Medication 10 ML: at 09:17

## 2019-01-24 ASSESSMENT — PAIN DESCRIPTION - PROGRESSION: CLINICAL_PROGRESSION: NOT CHANGED

## 2019-01-24 ASSESSMENT — PAIN SCALES - GENERAL
PAINLEVEL_OUTOF10: 5

## 2019-01-24 ASSESSMENT — PAIN DESCRIPTION - DESCRIPTORS: DESCRIPTORS: ACHING;SORE;DISCOMFORT

## 2019-01-24 ASSESSMENT — PAIN DESCRIPTION - PAIN TYPE: TYPE: ACUTE PAIN

## 2019-01-24 ASSESSMENT — PAIN DESCRIPTION - LOCATION: LOCATION: NECK

## 2019-01-24 ASSESSMENT — PAIN DESCRIPTION - ONSET: ONSET: ON-GOING

## 2019-01-24 ASSESSMENT — PAIN DESCRIPTION - FREQUENCY: FREQUENCY: CONTINUOUS

## 2019-01-25 ENCOUNTER — HOSPITAL ENCOUNTER (OUTPATIENT)
Dept: PHYSICAL THERAPY | Age: 49
Setting detail: THERAPIES SERIES
Discharge: HOME | End: 2019-01-25
Payer: COMMERCIAL

## 2019-01-25 ENCOUNTER — TELEPHONE (OUTPATIENT)
Dept: PRIMARY CARE CLINIC | Age: 49
End: 2019-01-25

## 2019-02-07 ENCOUNTER — OFFICE VISIT (OUTPATIENT)
Dept: NEUROSURGERY | Age: 49
End: 2019-02-07
Payer: COMMERCIAL

## 2019-02-07 VITALS
HEIGHT: 70 IN | SYSTOLIC BLOOD PRESSURE: 139 MMHG | WEIGHT: 315 LBS | DIASTOLIC BLOOD PRESSURE: 83 MMHG | HEART RATE: 75 BPM | BODY MASS INDEX: 45.1 KG/M2

## 2019-02-07 DIAGNOSIS — G82.50 QUADRIPLEGIA (HCC): Primary | ICD-10-CM

## 2019-02-07 DIAGNOSIS — G95.9 CERVICAL MYELOPATHY (HCC): ICD-10-CM

## 2019-02-07 DIAGNOSIS — G82.50 QUADRIPARESIS (HCC): Primary | ICD-10-CM

## 2019-02-07 DIAGNOSIS — M50.20 CERVICAL HERNIATED DISC: ICD-10-CM

## 2019-02-07 PROCEDURE — 99213 OFFICE O/P EST LOW 20 MIN: CPT | Performed by: NEUROLOGICAL SURGERY

## 2019-02-07 PROCEDURE — 4004F PT TOBACCO SCREEN RCVD TLK: CPT | Performed by: NEUROLOGICAL SURGERY

## 2019-02-07 PROCEDURE — G8417 CALC BMI ABV UP PARAM F/U: HCPCS | Performed by: NEUROLOGICAL SURGERY

## 2019-02-07 PROCEDURE — G8484 FLU IMMUNIZE NO ADMIN: HCPCS | Performed by: NEUROLOGICAL SURGERY

## 2019-02-07 PROCEDURE — G8427 DOCREV CUR MEDS BY ELIG CLIN: HCPCS | Performed by: NEUROLOGICAL SURGERY

## 2019-02-08 ENCOUNTER — PREP FOR PROCEDURE (OUTPATIENT)
Dept: NEUROSURGERY | Age: 49
End: 2019-02-08

## 2019-02-08 DIAGNOSIS — M50.20 HERNIATION OF CERVICAL INTERVERTEBRAL DISC: Primary | ICD-10-CM

## 2019-02-08 RX ORDER — SODIUM CHLORIDE 0.9 % (FLUSH) 0.9 %
10 SYRINGE (ML) INJECTION PRN
Status: CANCELLED | OUTPATIENT
Start: 2019-02-08

## 2019-02-08 RX ORDER — SODIUM CHLORIDE 9 MG/ML
INJECTION, SOLUTION INTRAVENOUS CONTINUOUS
Status: CANCELLED | OUTPATIENT
Start: 2019-02-08

## 2019-02-08 RX ORDER — SODIUM CHLORIDE 0.9 % (FLUSH) 0.9 %
10 SYRINGE (ML) INJECTION EVERY 12 HOURS SCHEDULED
Status: CANCELLED | OUTPATIENT
Start: 2019-02-08

## 2019-02-11 RX ORDER — LANOLIN ALCOHOL/MO/W.PET/CERES
3 CREAM (GRAM) TOPICAL NIGHTLY PRN
COMMUNITY
End: 2019-06-06

## 2019-02-11 RX ORDER — HYDROCORTISONE 0.5 %
CREAM (GRAM) TOPICAL 3 TIMES DAILY PRN
COMMUNITY
End: 2019-06-06

## 2019-02-11 RX ORDER — BISACODYL 10 MG
10 SUPPOSITORY, RECTAL RECTAL DAILY PRN
COMMUNITY
End: 2020-02-21

## 2019-02-11 RX ORDER — ACETAMINOPHEN 325 MG/1
650 TABLET ORAL EVERY 6 HOURS PRN
COMMUNITY
End: 2021-12-10

## 2019-02-13 ENCOUNTER — ANESTHESIA EVENT (OUTPATIENT)
Dept: OPERATING ROOM | Age: 49
End: 2019-02-13
Payer: COMMERCIAL

## 2019-02-14 ENCOUNTER — APPOINTMENT (OUTPATIENT)
Dept: GENERAL RADIOLOGY | Age: 49
End: 2019-02-14
Attending: NEUROLOGICAL SURGERY
Payer: COMMERCIAL

## 2019-02-14 ENCOUNTER — ANESTHESIA (OUTPATIENT)
Dept: OPERATING ROOM | Age: 49
End: 2019-02-14
Payer: COMMERCIAL

## 2019-02-14 ENCOUNTER — HOSPITAL ENCOUNTER (OUTPATIENT)
Age: 49
Setting detail: OBSERVATION
Discharge: NURSING, REHAB OR LONG-TERM CARE | End: 2019-02-15
Attending: NEUROLOGICAL SURGERY | Admitting: NEUROLOGICAL SURGERY
Payer: COMMERCIAL

## 2019-02-14 VITALS — OXYGEN SATURATION: 100 % | SYSTOLIC BLOOD PRESSURE: 144 MMHG | DIASTOLIC BLOOD PRESSURE: 89 MMHG | TEMPERATURE: 97 F

## 2019-02-14 DIAGNOSIS — M50.20 HERNIATION OF CERVICAL INTERVERTEBRAL DISC: ICD-10-CM

## 2019-02-14 DIAGNOSIS — G95.9 MYELOPATHY (HCC): ICD-10-CM

## 2019-02-14 LAB
ABO/RH: NORMAL
ANTIBODY SCREEN: NORMAL

## 2019-02-14 PROCEDURE — C1713 ANCHOR/SCREW BN/BN,TIS/BN: HCPCS | Performed by: NEUROLOGICAL SURGERY

## 2019-02-14 PROCEDURE — 3600000004 HC SURGERY LEVEL 4 BASE: Performed by: NEUROLOGICAL SURGERY

## 2019-02-14 PROCEDURE — 86900 BLOOD TYPING SEROLOGIC ABO: CPT

## 2019-02-14 PROCEDURE — 36415 COLL VENOUS BLD VENIPUNCTURE: CPT

## 2019-02-14 PROCEDURE — G0378 HOSPITAL OBSERVATION PER HR: HCPCS

## 2019-02-14 PROCEDURE — 2580000003 HC RX 258: Performed by: PHYSICIAN ASSISTANT

## 2019-02-14 PROCEDURE — 6360000002 HC RX W HCPCS: Performed by: PHYSICIAN ASSISTANT

## 2019-02-14 PROCEDURE — 6360000002 HC RX W HCPCS: Performed by: ANESTHESIOLOGY

## 2019-02-14 PROCEDURE — 6370000000 HC RX 637 (ALT 250 FOR IP): Performed by: PHYSICIAN ASSISTANT

## 2019-02-14 PROCEDURE — 6360000002 HC RX W HCPCS: Performed by: REGISTERED NURSE

## 2019-02-14 PROCEDURE — 20931 SP BONE ALGRFT STRUCT ADD-ON: CPT | Performed by: NEUROLOGICAL SURGERY

## 2019-02-14 PROCEDURE — 2580000003 HC RX 258: Performed by: NEUROLOGICAL SURGERY

## 2019-02-14 PROCEDURE — 88304 TISSUE EXAM BY PATHOLOGIST: CPT

## 2019-02-14 PROCEDURE — 2720000010 HC SURG SUPPLY STERILE: Performed by: NEUROLOGICAL SURGERY

## 2019-02-14 PROCEDURE — 22551 ARTHRD ANT NTRBDY CERVICAL: CPT | Performed by: NEUROLOGICAL SURGERY

## 2019-02-14 PROCEDURE — 7100000000 HC PACU RECOVERY - FIRST 15 MIN: Performed by: NEUROLOGICAL SURGERY

## 2019-02-14 PROCEDURE — 3700000000 HC ANESTHESIA ATTENDED CARE: Performed by: NEUROLOGICAL SURGERY

## 2019-02-14 PROCEDURE — 3600000014 HC SURGERY LEVEL 4 ADDTL 15MIN: Performed by: NEUROLOGICAL SURGERY

## 2019-02-14 PROCEDURE — 2709999900 HC NON-CHARGEABLE SUPPLY: Performed by: NEUROLOGICAL SURGERY

## 2019-02-14 PROCEDURE — 22845 INSERT SPINE FIXATION DEVICE: CPT | Performed by: NEUROLOGICAL SURGERY

## 2019-02-14 PROCEDURE — 86901 BLOOD TYPING SEROLOGIC RH(D): CPT

## 2019-02-14 PROCEDURE — 3209999900 FLUORO FOR SURGICAL PROCEDURES

## 2019-02-14 PROCEDURE — 6360000002 HC RX W HCPCS: Performed by: NEUROLOGICAL SURGERY

## 2019-02-14 PROCEDURE — 7100000001 HC PACU RECOVERY - ADDTL 15 MIN: Performed by: NEUROLOGICAL SURGERY

## 2019-02-14 PROCEDURE — 2500000003 HC RX 250 WO HCPCS: Performed by: REGISTERED NURSE

## 2019-02-14 PROCEDURE — 22845 INSERT SPINE FIXATION DEVICE: CPT | Performed by: PHYSICIAN ASSISTANT

## 2019-02-14 PROCEDURE — 2780000010 HC IMPLANT OTHER: Performed by: NEUROLOGICAL SURGERY

## 2019-02-14 PROCEDURE — 3700000001 HC ADD 15 MINUTES (ANESTHESIA): Performed by: NEUROLOGICAL SURGERY

## 2019-02-14 PROCEDURE — 2500000003 HC RX 250 WO HCPCS: Performed by: NEUROLOGICAL SURGERY

## 2019-02-14 PROCEDURE — 86850 RBC ANTIBODY SCREEN: CPT

## 2019-02-14 PROCEDURE — 22551 ARTHRD ANT NTRBDY CERVICAL: CPT | Performed by: PHYSICIAN ASSISTANT

## 2019-02-14 DEVICE — XTEND ANTERIOR CERVICAL PLATE, 1-LEVEL, 12MM
Type: IMPLANTABLE DEVICE | Site: NECK | Status: FUNCTIONAL
Brand: XTEND

## 2019-02-14 DEVICE — GRAFT BNE SUB W12XH8XL14MM CANC CORT ANT CERV INTBDY FUS: Type: IMPLANTABLE DEVICE | Site: NECK | Status: FUNCTIONAL

## 2019-02-14 DEVICE — XTEND 4.2MM VARIABLE ANGLE SCREW, SELF-DRILLING, 16MM
Type: IMPLANTABLE DEVICE | Site: NECK | Status: FUNCTIONAL
Brand: XTEND

## 2019-02-14 RX ORDER — DOCUSATE SODIUM 100 MG/1
100 CAPSULE, LIQUID FILLED ORAL 2 TIMES DAILY
Status: DISCONTINUED | OUTPATIENT
Start: 2019-02-14 | End: 2019-02-15 | Stop reason: HOSPADM

## 2019-02-14 RX ORDER — OXYCODONE HYDROCHLORIDE AND ACETAMINOPHEN 5; 325 MG/1; MG/1
2 TABLET ORAL PRN
Status: DISCONTINUED | OUTPATIENT
Start: 2019-02-14 | End: 2019-02-14

## 2019-02-14 RX ORDER — PANTOPRAZOLE SODIUM 40 MG/1
40 TABLET, DELAYED RELEASE ORAL DAILY
Status: DISCONTINUED | OUTPATIENT
Start: 2019-02-14 | End: 2019-02-15 | Stop reason: HOSPADM

## 2019-02-14 RX ORDER — OXYCODONE HYDROCHLORIDE AND ACETAMINOPHEN 5; 325 MG/1; MG/1
2 TABLET ORAL EVERY 4 HOURS PRN
Status: DISCONTINUED | OUTPATIENT
Start: 2019-02-14 | End: 2019-02-15 | Stop reason: HOSPADM

## 2019-02-14 RX ORDER — ONDANSETRON 2 MG/ML
4 INJECTION INTRAMUSCULAR; INTRAVENOUS
Status: DISCONTINUED | OUTPATIENT
Start: 2019-02-14 | End: 2019-02-14 | Stop reason: HOSPADM

## 2019-02-14 RX ORDER — SODIUM CHLORIDE 0.9 % (FLUSH) 0.9 %
10 SYRINGE (ML) INJECTION PRN
Status: DISCONTINUED | OUTPATIENT
Start: 2019-02-14 | End: 2019-02-15 | Stop reason: HOSPADM

## 2019-02-14 RX ORDER — FENTANYL CITRATE 50 UG/ML
INJECTION, SOLUTION INTRAMUSCULAR; INTRAVENOUS PRN
Status: DISCONTINUED | OUTPATIENT
Start: 2019-02-14 | End: 2019-02-14 | Stop reason: SDUPTHER

## 2019-02-14 RX ORDER — SODIUM CHLORIDE 0.9 % (FLUSH) 0.9 %
10 SYRINGE (ML) INJECTION EVERY 12 HOURS SCHEDULED
Status: DISCONTINUED | OUTPATIENT
Start: 2019-02-14 | End: 2019-02-15 | Stop reason: HOSPADM

## 2019-02-14 RX ORDER — DEXAMETHASONE SODIUM PHOSPHATE 10 MG/ML
INJECTION, SOLUTION INTRAMUSCULAR; INTRAVENOUS PRN
Status: DISCONTINUED | OUTPATIENT
Start: 2019-02-14 | End: 2019-02-14 | Stop reason: SDUPTHER

## 2019-02-14 RX ORDER — ONDANSETRON 2 MG/ML
4 INJECTION INTRAMUSCULAR; INTRAVENOUS EVERY 6 HOURS PRN
Status: DISCONTINUED | OUTPATIENT
Start: 2019-02-14 | End: 2019-02-15 | Stop reason: HOSPADM

## 2019-02-14 RX ORDER — MORPHINE SULFATE 2 MG/ML
1 INJECTION, SOLUTION INTRAMUSCULAR; INTRAVENOUS EVERY 5 MIN PRN
Status: DISCONTINUED | OUTPATIENT
Start: 2019-02-14 | End: 2019-02-14 | Stop reason: HOSPADM

## 2019-02-14 RX ORDER — ACETAMINOPHEN 325 MG/1
650 TABLET ORAL EVERY 4 HOURS PRN
Status: DISCONTINUED | OUTPATIENT
Start: 2019-02-14 | End: 2019-02-15 | Stop reason: HOSPADM

## 2019-02-14 RX ORDER — CYCLOBENZAPRINE HCL 10 MG
10 TABLET ORAL 3 TIMES DAILY PRN
Status: DISCONTINUED | OUTPATIENT
Start: 2019-02-14 | End: 2019-02-15 | Stop reason: HOSPADM

## 2019-02-14 RX ORDER — ATORVASTATIN CALCIUM 20 MG/1
20 TABLET, FILM COATED ORAL DAILY
Status: DISCONTINUED | OUTPATIENT
Start: 2019-02-14 | End: 2019-02-15 | Stop reason: HOSPADM

## 2019-02-14 RX ORDER — OXYCODONE HYDROCHLORIDE AND ACETAMINOPHEN 5; 325 MG/1; MG/1
1 TABLET ORAL PRN
Status: DISCONTINUED | OUTPATIENT
Start: 2019-02-14 | End: 2019-02-14

## 2019-02-14 RX ORDER — OXYCODONE HYDROCHLORIDE AND ACETAMINOPHEN 5; 325 MG/1; MG/1
1 TABLET ORAL EVERY 4 HOURS PRN
Status: DISCONTINUED | OUTPATIENT
Start: 2019-02-14 | End: 2019-02-15 | Stop reason: HOSPADM

## 2019-02-14 RX ORDER — SODIUM CHLORIDE 9 MG/ML
INJECTION, SOLUTION INTRAVENOUS CONTINUOUS
Status: DISCONTINUED | OUTPATIENT
Start: 2019-02-14 | End: 2019-02-15 | Stop reason: HOSPADM

## 2019-02-14 RX ORDER — BISACODYL 10 MG
10 SUPPOSITORY, RECTAL RECTAL DAILY PRN
Status: DISCONTINUED | OUTPATIENT
Start: 2019-02-14 | End: 2019-02-15 | Stop reason: HOSPADM

## 2019-02-14 RX ORDER — SUCCINYLCHOLINE CHLORIDE 20 MG/ML
INJECTION INTRAMUSCULAR; INTRAVENOUS PRN
Status: DISCONTINUED | OUTPATIENT
Start: 2019-02-14 | End: 2019-02-14 | Stop reason: SDUPTHER

## 2019-02-14 RX ORDER — MORPHINE SULFATE 2 MG/ML
2 INJECTION, SOLUTION INTRAMUSCULAR; INTRAVENOUS EVERY 5 MIN PRN
Status: DISCONTINUED | OUTPATIENT
Start: 2019-02-14 | End: 2019-02-14 | Stop reason: HOSPADM

## 2019-02-14 RX ORDER — MORPHINE SULFATE 2 MG/ML
2 INJECTION, SOLUTION INTRAMUSCULAR; INTRAVENOUS
Status: DISCONTINUED | OUTPATIENT
Start: 2019-02-14 | End: 2019-02-15 | Stop reason: HOSPADM

## 2019-02-14 RX ORDER — MORPHINE SULFATE 4 MG/ML
4 INJECTION, SOLUTION INTRAMUSCULAR; INTRAVENOUS
Status: DISCONTINUED | OUTPATIENT
Start: 2019-02-14 | End: 2019-02-15 | Stop reason: HOSPADM

## 2019-02-14 RX ORDER — SODIUM CHLORIDE 9 MG/ML
INJECTION, SOLUTION INTRAVENOUS CONTINUOUS
Status: DISCONTINUED | OUTPATIENT
Start: 2019-02-14 | End: 2019-02-14

## 2019-02-14 RX ORDER — VANCOMYCIN HYDROCHLORIDE 500 MG/10ML
INJECTION, POWDER, LYOPHILIZED, FOR SOLUTION INTRAVENOUS PRN
Status: DISCONTINUED | OUTPATIENT
Start: 2019-02-14 | End: 2019-02-14 | Stop reason: HOSPADM

## 2019-02-14 RX ORDER — NEOSTIGMINE METHYLSULFATE 1 MG/ML
INJECTION, SOLUTION INTRAVENOUS PRN
Status: DISCONTINUED | OUTPATIENT
Start: 2019-02-14 | End: 2019-02-14 | Stop reason: SDUPTHER

## 2019-02-14 RX ORDER — ONDANSETRON 2 MG/ML
INJECTION INTRAMUSCULAR; INTRAVENOUS PRN
Status: DISCONTINUED | OUTPATIENT
Start: 2019-02-14 | End: 2019-02-14 | Stop reason: SDUPTHER

## 2019-02-14 RX ORDER — PROPOFOL 10 MG/ML
INJECTION, EMULSION INTRAVENOUS PRN
Status: DISCONTINUED | OUTPATIENT
Start: 2019-02-14 | End: 2019-02-14 | Stop reason: SDUPTHER

## 2019-02-14 RX ORDER — SODIUM CHLORIDE 0.9 % (FLUSH) 0.9 %
10 SYRINGE (ML) INJECTION EVERY 12 HOURS SCHEDULED
Status: DISCONTINUED | OUTPATIENT
Start: 2019-02-14 | End: 2019-02-14

## 2019-02-14 RX ORDER — ACETAMINOPHEN 325 MG/1
650 TABLET ORAL EVERY 6 HOURS PRN
Status: DISCONTINUED | OUTPATIENT
Start: 2019-02-14 | End: 2019-02-14 | Stop reason: SDUPTHER

## 2019-02-14 RX ORDER — MIDAZOLAM HYDROCHLORIDE 1 MG/ML
INJECTION INTRAMUSCULAR; INTRAVENOUS PRN
Status: DISCONTINUED | OUTPATIENT
Start: 2019-02-14 | End: 2019-02-14 | Stop reason: SDUPTHER

## 2019-02-14 RX ORDER — ROCURONIUM BROMIDE 10 MG/ML
INJECTION, SOLUTION INTRAVENOUS PRN
Status: DISCONTINUED | OUTPATIENT
Start: 2019-02-14 | End: 2019-02-14 | Stop reason: SDUPTHER

## 2019-02-14 RX ORDER — MEPERIDINE HYDROCHLORIDE 50 MG/ML
12.5 INJECTION INTRAMUSCULAR; INTRAVENOUS; SUBCUTANEOUS
Status: DISCONTINUED | OUTPATIENT
Start: 2019-02-14 | End: 2019-02-14 | Stop reason: HOSPADM

## 2019-02-14 RX ORDER — GLYCOPYRROLATE 1 MG/5 ML
SYRINGE (ML) INTRAVENOUS PRN
Status: DISCONTINUED | OUTPATIENT
Start: 2019-02-14 | End: 2019-02-14 | Stop reason: SDUPTHER

## 2019-02-14 RX ORDER — SODIUM CHLORIDE 0.9 % (FLUSH) 0.9 %
10 SYRINGE (ML) INJECTION PRN
Status: DISCONTINUED | OUTPATIENT
Start: 2019-02-14 | End: 2019-02-14

## 2019-02-14 RX ADMIN — SUCCINYLCHOLINE CHLORIDE 180 MG: 20 INJECTION, SOLUTION INTRAMUSCULAR; INTRAVENOUS at 10:18

## 2019-02-14 RX ADMIN — PROPOFOL 200 MG: 10 INJECTION, EMULSION INTRAVENOUS at 10:18

## 2019-02-14 RX ADMIN — SODIUM CHLORIDE: 9 INJECTION, SOLUTION INTRAVENOUS at 13:20

## 2019-02-14 RX ADMIN — HYDROMORPHONE HYDROCHLORIDE 0.5 MG: 1 INJECTION, SOLUTION INTRAMUSCULAR; INTRAVENOUS; SUBCUTANEOUS at 12:40

## 2019-02-14 RX ADMIN — ONDANSETRON HYDROCHLORIDE 4 MG: 2 INJECTION, SOLUTION INTRAMUSCULAR; INTRAVENOUS at 11:37

## 2019-02-14 RX ADMIN — Medication 10 ML: at 09:15

## 2019-02-14 RX ADMIN — FENTANYL CITRATE 50 MCG: 50 INJECTION, SOLUTION INTRAMUSCULAR; INTRAVENOUS at 11:45

## 2019-02-14 RX ADMIN — CEFAZOLIN SODIUM 3 G: 10 INJECTION, POWDER, FOR SOLUTION INTRAVENOUS at 19:39

## 2019-02-14 RX ADMIN — FENTANYL CITRATE 100 MCG: 50 INJECTION, SOLUTION INTRAMUSCULAR; INTRAVENOUS at 10:18

## 2019-02-14 RX ADMIN — OXYCODONE AND ACETAMINOPHEN 1 TABLET: 5; 325 TABLET ORAL at 21:21

## 2019-02-14 RX ADMIN — OXYCODONE AND ACETAMINOPHEN 1 TABLET: 5; 325 TABLET ORAL at 15:26

## 2019-02-14 RX ADMIN — Medication 10 ML: at 22:14

## 2019-02-14 RX ADMIN — DEXAMETHASONE SODIUM PHOSPHATE 10 MG: 10 INJECTION INTRAMUSCULAR; INTRAVENOUS at 10:18

## 2019-02-14 RX ADMIN — MIDAZOLAM HYDROCHLORIDE 2 MG: 1 INJECTION, SOLUTION INTRAMUSCULAR; INTRAVENOUS at 10:16

## 2019-02-14 RX ADMIN — CYCLOBENZAPRINE HYDROCHLORIDE 10 MG: 10 TABLET, FILM COATED ORAL at 21:21

## 2019-02-14 RX ADMIN — DEXTROSE MONOHYDRATE 3 G: 50 INJECTION, SOLUTION INTRAVENOUS at 10:27

## 2019-02-14 RX ADMIN — ATORVASTATIN CALCIUM 20 MG: 20 TABLET, FILM COATED ORAL at 21:26

## 2019-02-14 RX ADMIN — SODIUM CHLORIDE: 9 INJECTION, SOLUTION INTRAVENOUS at 09:11

## 2019-02-14 RX ADMIN — DOCUSATE SODIUM 100 MG: 100 CAPSULE, LIQUID FILLED ORAL at 21:20

## 2019-02-14 RX ADMIN — Medication 0.6 MG: at 11:45

## 2019-02-14 RX ADMIN — ROCURONIUM BROMIDE 50 MG: 10 INJECTION INTRAVENOUS at 10:22

## 2019-02-14 RX ADMIN — DOCUSATE SODIUM 100 MG: 100 CAPSULE, LIQUID FILLED ORAL at 14:44

## 2019-02-14 RX ADMIN — HYDROMORPHONE HYDROCHLORIDE 0.5 MG: 1 INJECTION, SOLUTION INTRAMUSCULAR; INTRAVENOUS; SUBCUTANEOUS at 12:30

## 2019-02-14 RX ADMIN — Medication 3 MG: at 11:45

## 2019-02-14 ASSESSMENT — PULMONARY FUNCTION TESTS
PIF_VALUE: 31
PIF_VALUE: 3
PIF_VALUE: 35
PIF_VALUE: 13
PIF_VALUE: 31
PIF_VALUE: 2
PIF_VALUE: 31
PIF_VALUE: 4
PIF_VALUE: 31
PIF_VALUE: 35
PIF_VALUE: 31
PIF_VALUE: 4
PIF_VALUE: 30
PIF_VALUE: 26
PIF_VALUE: 31
PIF_VALUE: 31
PIF_VALUE: 38
PIF_VALUE: 31
PIF_VALUE: 31
PIF_VALUE: 2
PIF_VALUE: 36
PIF_VALUE: 31
PIF_VALUE: 27
PIF_VALUE: 31
PIF_VALUE: 39
PIF_VALUE: 37
PIF_VALUE: 31
PIF_VALUE: 3
PIF_VALUE: 31
PIF_VALUE: 29
PIF_VALUE: 31
PIF_VALUE: 2
PIF_VALUE: 31
PIF_VALUE: 31
PIF_VALUE: 30
PIF_VALUE: 31
PIF_VALUE: 27
PIF_VALUE: 31
PIF_VALUE: 31
PIF_VALUE: 1
PIF_VALUE: 31
PIF_VALUE: 32
PIF_VALUE: 31
PIF_VALUE: 4
PIF_VALUE: 40
PIF_VALUE: 31
PIF_VALUE: 29
PIF_VALUE: 31
PIF_VALUE: 31
PIF_VALUE: 38
PIF_VALUE: 31
PIF_VALUE: 3
PIF_VALUE: 31
PIF_VALUE: 55
PIF_VALUE: 31
PIF_VALUE: 40
PIF_VALUE: 6
PIF_VALUE: 34
PIF_VALUE: 31
PIF_VALUE: 20
PIF_VALUE: 31
PIF_VALUE: 36

## 2019-02-14 ASSESSMENT — PAIN DESCRIPTION - PAIN TYPE
TYPE: SURGICAL PAIN

## 2019-02-14 ASSESSMENT — PAIN DESCRIPTION - DESCRIPTORS
DESCRIPTORS: ACHING;PRESSURE;STABBING
DESCRIPTORS: ACHING;CRAMPING
DESCRIPTORS: DULL;ACHING
DESCRIPTORS: DULL;ACHING

## 2019-02-14 ASSESSMENT — PAIN DESCRIPTION - ORIENTATION
ORIENTATION: POSTERIOR

## 2019-02-14 ASSESSMENT — PAIN SCALES - GENERAL
PAINLEVEL_OUTOF10: 7
PAINLEVEL_OUTOF10: 5
PAINLEVEL_OUTOF10: 4
PAINLEVEL_OUTOF10: 6
PAINLEVEL_OUTOF10: 3
PAINLEVEL_OUTOF10: 7
PAINLEVEL_OUTOF10: 3
PAINLEVEL_OUTOF10: 0

## 2019-02-14 ASSESSMENT — PAIN DESCRIPTION - DIRECTION
RADIATING_TOWARDS: SHOULDERS

## 2019-02-14 ASSESSMENT — PAIN DESCRIPTION - LOCATION
LOCATION: NECK

## 2019-02-14 ASSESSMENT — PAIN - FUNCTIONAL ASSESSMENT: PAIN_FUNCTIONAL_ASSESSMENT: 0-10

## 2019-02-15 VITALS
OXYGEN SATURATION: 94 % | TEMPERATURE: 97.6 F | RESPIRATION RATE: 16 BRPM | BODY MASS INDEX: 45.1 KG/M2 | WEIGHT: 315 LBS | HEART RATE: 74 BPM | SYSTOLIC BLOOD PRESSURE: 119 MMHG | HEIGHT: 70 IN | DIASTOLIC BLOOD PRESSURE: 65 MMHG

## 2019-02-15 PROCEDURE — 6360000002 HC RX W HCPCS: Performed by: PHYSICIAN ASSISTANT

## 2019-02-15 PROCEDURE — 2580000003 HC RX 258: Performed by: PHYSICIAN ASSISTANT

## 2019-02-15 PROCEDURE — G0378 HOSPITAL OBSERVATION PER HR: HCPCS

## 2019-02-15 PROCEDURE — 97162 PT EVAL MOD COMPLEX 30 MIN: CPT

## 2019-02-15 PROCEDURE — 6370000000 HC RX 637 (ALT 250 FOR IP): Performed by: PHYSICIAN ASSISTANT

## 2019-02-15 PROCEDURE — 97165 OT EVAL LOW COMPLEX 30 MIN: CPT

## 2019-02-15 PROCEDURE — 97530 THERAPEUTIC ACTIVITIES: CPT

## 2019-02-15 RX ORDER — CYCLOBENZAPRINE HCL 10 MG
10 TABLET ORAL 3 TIMES DAILY PRN
Qty: 40 TABLET | Refills: 0 | Status: SHIPPED | OUTPATIENT
Start: 2019-02-15 | End: 2019-02-25

## 2019-02-15 RX ORDER — OXYCODONE HYDROCHLORIDE AND ACETAMINOPHEN 5; 325 MG/1; MG/1
1 TABLET ORAL EVERY 4 HOURS PRN
Qty: 42 TABLET | Refills: 0 | Status: SHIPPED | OUTPATIENT
Start: 2019-02-15 | End: 2019-02-22

## 2019-02-15 RX ADMIN — OXYCODONE AND ACETAMINOPHEN 1 TABLET: 5; 325 TABLET ORAL at 01:25

## 2019-02-15 RX ADMIN — CEFAZOLIN SODIUM 3 G: 10 INJECTION, POWDER, FOR SOLUTION INTRAVENOUS at 03:18

## 2019-02-15 RX ADMIN — PANTOPRAZOLE SODIUM 40 MG: 40 TABLET, DELAYED RELEASE ORAL at 08:58

## 2019-02-15 RX ADMIN — OXYCODONE AND ACETAMINOPHEN 1 TABLET: 5; 325 TABLET ORAL at 09:03

## 2019-02-15 RX ADMIN — DOCUSATE SODIUM 100 MG: 100 CAPSULE, LIQUID FILLED ORAL at 08:58

## 2019-02-15 ASSESSMENT — PAIN DESCRIPTION - PAIN TYPE: TYPE: SURGICAL PAIN

## 2019-02-15 ASSESSMENT — PAIN SCALES - GENERAL
PAINLEVEL_OUTOF10: 7
PAINLEVEL_OUTOF10: 2
PAINLEVEL_OUTOF10: 6
PAINLEVEL_OUTOF10: 3
PAINLEVEL_OUTOF10: 5

## 2019-02-15 ASSESSMENT — PAIN DESCRIPTION - DESCRIPTORS: DESCRIPTORS: ACHING;DISCOMFORT

## 2019-02-15 ASSESSMENT — PAIN DESCRIPTION - LOCATION: LOCATION: NECK;SHOULDER

## 2019-02-18 ENCOUNTER — TELEPHONE (OUTPATIENT)
Dept: PRIMARY CARE CLINIC | Age: 49
End: 2019-02-18

## 2019-02-18 ENCOUNTER — TELEPHONE (OUTPATIENT)
Dept: NEUROSURGERY | Age: 49
End: 2019-02-18

## 2019-02-19 ENCOUNTER — TELEPHONE (OUTPATIENT)
Dept: NEUROSURGERY | Age: 49
End: 2019-02-19

## 2019-03-13 ENCOUNTER — HOSPITAL ENCOUNTER (OUTPATIENT)
Dept: GENERAL RADIOLOGY | Age: 49
Discharge: HOME | End: 2019-03-15
Payer: COMMERCIAL

## 2019-03-13 ENCOUNTER — HOSPITAL ENCOUNTER (OUTPATIENT)
Age: 49
Discharge: HOME | End: 2019-03-15
Payer: COMMERCIAL

## 2019-03-13 DIAGNOSIS — G95.9 CERVICAL MYELOPATHY (HCC): ICD-10-CM

## 2019-03-13 DIAGNOSIS — G82.50 QUADRIPARESIS (HCC): ICD-10-CM

## 2019-03-13 DIAGNOSIS — M50.20 CERVICAL HERNIATED DISC: ICD-10-CM

## 2019-03-13 PROCEDURE — 72040 X-RAY EXAM NECK SPINE 2-3 VW: CPT

## 2019-03-14 ENCOUNTER — OFFICE VISIT (OUTPATIENT)
Dept: NEUROSURGERY | Age: 49
End: 2019-03-14

## 2019-03-14 VITALS
SYSTOLIC BLOOD PRESSURE: 144 MMHG | HEIGHT: 69 IN | BODY MASS INDEX: 46.65 KG/M2 | DIASTOLIC BLOOD PRESSURE: 82 MMHG | HEART RATE: 77 BPM | WEIGHT: 315 LBS

## 2019-03-14 DIAGNOSIS — M50.90 CERVICAL DISC DISEASE: Primary | ICD-10-CM

## 2019-03-14 PROCEDURE — 99024 POSTOP FOLLOW-UP VISIT: CPT | Performed by: PHYSICIAN ASSISTANT

## 2019-04-04 ENCOUNTER — TELEPHONE (OUTPATIENT)
Dept: NEUROSURGERY | Age: 49
End: 2019-04-04

## 2019-04-04 DIAGNOSIS — Z98.1 S/P CERVICAL SPINAL FUSION: Primary | ICD-10-CM

## 2019-05-07 ENCOUNTER — HOSPITAL ENCOUNTER (OUTPATIENT)
Age: 49
Discharge: HOME | End: 2019-05-09
Payer: COMMERCIAL

## 2019-05-07 ENCOUNTER — HOSPITAL ENCOUNTER (OUTPATIENT)
Dept: GENERAL RADIOLOGY | Age: 49
Discharge: HOME | End: 2019-05-09
Payer: COMMERCIAL

## 2019-05-07 DIAGNOSIS — Z98.1 S/P CERVICAL SPINAL FUSION: ICD-10-CM

## 2019-05-07 PROCEDURE — 72050 X-RAY EXAM NECK SPINE 4/5VWS: CPT

## 2019-05-09 ENCOUNTER — OFFICE VISIT (OUTPATIENT)
Dept: NEUROSURGERY | Age: 49
End: 2019-05-09

## 2019-05-09 VITALS
HEIGHT: 69 IN | SYSTOLIC BLOOD PRESSURE: 129 MMHG | DIASTOLIC BLOOD PRESSURE: 75 MMHG | BODY MASS INDEX: 46.65 KG/M2 | WEIGHT: 315 LBS | HEART RATE: 89 BPM

## 2019-05-09 DIAGNOSIS — M54.12 CERVICAL RADICULOPATHY: Primary | ICD-10-CM

## 2019-05-09 PROCEDURE — 99024 POSTOP FOLLOW-UP VISIT: CPT | Performed by: PHYSICIAN ASSISTANT

## 2019-05-09 RX ORDER — M-VIT,TX,IRON,MINS/CALC/FOLIC 27MG-0.4MG
1 TABLET ORAL DAILY
COMMUNITY
End: 2020-11-02

## 2019-05-09 NOTE — PROGRESS NOTES
Post Operative Follow-up    Patient is status post:  Cervical fusion. estuardo op sited pain well. No arm pain. Hand numbness improving. Ambulating with at walker. Good bladder control    Physical Exam  Alert and Oriented X 3  PERRLA, EOMI  BOND 5/5  Wound: C/D/I    A/P: patient is s/p C5-6 ACDF. Doing well. X-rays stable. No restrictions. F/u 9 months.

## 2019-06-06 ENCOUNTER — OFFICE VISIT (OUTPATIENT)
Dept: FAMILY MEDICINE CLINIC | Age: 49
End: 2019-06-06
Payer: COMMERCIAL

## 2019-06-06 VITALS
SYSTOLIC BLOOD PRESSURE: 110 MMHG | BODY MASS INDEX: 46.65 KG/M2 | HEART RATE: 81 BPM | WEIGHT: 315 LBS | HEIGHT: 69 IN | OXYGEN SATURATION: 98 % | TEMPERATURE: 98.4 F | DIASTOLIC BLOOD PRESSURE: 78 MMHG

## 2019-06-06 DIAGNOSIS — Z99.89 OSA ON CPAP: ICD-10-CM

## 2019-06-06 DIAGNOSIS — K21.9 GASTROESOPHAGEAL REFLUX DISEASE, ESOPHAGITIS PRESENCE NOT SPECIFIED: ICD-10-CM

## 2019-06-06 DIAGNOSIS — E66.01 MORBID OBESITY (HCC): ICD-10-CM

## 2019-06-06 DIAGNOSIS — E78.49 OTHER HYPERLIPIDEMIA: Primary | ICD-10-CM

## 2019-06-06 DIAGNOSIS — G47.33 OSA ON CPAP: ICD-10-CM

## 2019-06-06 PROCEDURE — 99213 OFFICE O/P EST LOW 20 MIN: CPT | Performed by: INTERNAL MEDICINE

## 2019-06-06 PROCEDURE — G8427 DOCREV CUR MEDS BY ELIG CLIN: HCPCS | Performed by: INTERNAL MEDICINE

## 2019-06-06 PROCEDURE — 1036F TOBACCO NON-USER: CPT | Performed by: INTERNAL MEDICINE

## 2019-06-06 PROCEDURE — G8417 CALC BMI ABV UP PARAM F/U: HCPCS | Performed by: INTERNAL MEDICINE

## 2019-06-06 RX ORDER — PANTOPRAZOLE SODIUM 40 MG/1
40 TABLET, DELAYED RELEASE ORAL DAILY
Qty: 90 TABLET | Refills: 1 | Status: SHIPPED
Start: 2019-06-06 | End: 2020-02-21 | Stop reason: SDUPTHER

## 2019-06-06 RX ORDER — ATORVASTATIN CALCIUM 20 MG/1
10 TABLET, FILM COATED ORAL NIGHTLY
Qty: 45 TABLET | Refills: 1 | Status: SHIPPED
Start: 2019-06-06 | End: 2020-02-21 | Stop reason: SDUPTHER

## 2019-06-06 ASSESSMENT — ENCOUNTER SYMPTOMS
BLOOD IN STOOL: 0
EYE DISCHARGE: 0
SHORTNESS OF BREATH: 0
ABDOMINAL PAIN: 0
NAUSEA: 0

## 2019-06-06 ASSESSMENT — PATIENT HEALTH QUESTIONNAIRE - PHQ9
SUM OF ALL RESPONSES TO PHQ QUESTIONS 1-9: 0
1. LITTLE INTEREST OR PLEASURE IN DOING THINGS: 0
2. FEELING DOWN, DEPRESSED OR HOPELESS: 0
SUM OF ALL RESPONSES TO PHQ QUESTIONS 1-9: 0
SUM OF ALL RESPONSES TO PHQ9 QUESTIONS 1 & 2: 0

## 2019-06-06 NOTE — PROGRESS NOTES
Chief Complaint   Patient presents with    Hyperlipidemia     Last seen in March 2018    Constipation     taking stool softners BID, Having BM 3x weekly. HPI:  Patient is here for follow-up   Had C5 surgery, Therapy- extensive  Has c/o Constipation . On Metamucil  Uses walker for ambulation  Pt says slowly he is getting his strength         Allergy and Medications are reviewed and updated. Past Medical History, Surgical History, and Family History has been reviewed and updated. Review of Systems:  Review of Systems   Constitutional: Negative for chills and fever. HENT: Negative for congestion and ear pain. Eyes: Negative for discharge. Respiratory: Negative for shortness of breath (No new SOB). Cardiovascular: Negative for chest pain and leg swelling. Gastrointestinal: Negative for abdominal pain, blood in stool and nausea. Endocrine: Negative for polydipsia. Genitourinary: Negative for flank pain and hematuria. Musculoskeletal: Negative for myalgias and neck pain. Skin: Negative for rash. Neurological: Negative for dizziness and seizures. Hematological: Does not bruise/bleed easily. Psychiatric/Behavioral: Negative for hallucinations and suicidal ideas.          Vitals:    06/06/19 1020   BP: 110/78   Pulse: 81   Temp: 98.4 °F (36.9 °C)   SpO2: 98%   Weight: (!) 356 lb (161.5 kg)   Height: 5' 8.75\" (1.746 m)       Physical Exam   Neurological:   Some weakness ghulam in right Upper and LE   Uses walker         Labs :    Lab Results   Component Value Date    WBC 10.5 01/24/2019    HGB 14.3 01/24/2019    HCT 42.6 01/24/2019     01/24/2019    CHOL 157 05/30/2017    TRIG 115 05/30/2017    HDL 41 11/01/2018    ALT 18 01/21/2019    AST 19 01/21/2019     01/24/2019    K 4.6 01/24/2019     01/24/2019    CREATININE 1.0 01/24/2019    BUN 16 01/24/2019    CO2 25 01/24/2019    TSH 2.560 11/01/2018    GLUF 107 11/01/2018    LABA1C 5.6 05/30/2017     Lab Results   Component Value Date    COLORU Yellow 05/30/2017    NITRU Negative 05/30/2017    GLUCOSEU Negative 05/30/2017    GLUCOSEU NEGATIVE 12/16/2011    KETUA Negative 05/30/2017    UROBILINOGEN 0.2 05/30/2017    BILIRUBINUR Negative 05/30/2017    BILIRUBINUR NEGATIVE 12/16/2011         ASSESSMENT     Patient Active Problem List    Diagnosis Date Noted    Cervical herniated disc 02/14/2019    Herniation of cervical intervertebral disc     Myelopathy ([facility] [address]     Spinal stenosis of lumbar region without neurogenic claudication 01/22/2019    Cauda equina compression ([address] 01/20/2019    Hyperlipidemia 03/16/2018    GERD (gastroesophageal reflux disease) 03/16/2018    Morbid obesity ([address] 03/16/2018    JEFFREY on CPAP 03/16/2018        Diagnosis:     ICD-10-CM    1. Other hyperlipidemia E78.49 Lipid, Fasting     Comprehensive Metabolic Panel, Fasting     TSH without Reflex   2. Morbid obesity ([facility] [address] E66.01    3. JEFFREY on CPAP - Dr Katlyn Buck G47.33     Z99.89    4. Gastroesophageal reflux disease, esophagitis presence not specified K21.9 CBC Auto Differential       PLAN:       Pt is stable on current medical treatment. Continue current treatment plan    Side effects/Adverse effects/Precautions are reviewed with patient. Low salt, Low Carb diet an low fat diet  Continue medications as advised and take them regularly  Regular exercises as advised    Discussed natural and expected course of this diagnosis and need to alert me if symptoms do not follow expected course, or if any worse. Smoking cessation if applicable, discussed with patient. Advise to have ( Fasting) lab test prior to next visit. There are no Patient Instructions on file for this visit. Return in about 2 months (around 8/6/2019).

## 2019-06-12 ENCOUNTER — TELEPHONE (OUTPATIENT)
Dept: NEUROSURGERY | Age: 49
End: 2019-06-12

## 2019-06-12 DIAGNOSIS — S14.105D SPINAL CORD INJURY AT C5-C7 LEVEL WITHOUT INJURY OF SPINAL BONE, SUBSEQUENT ENCOUNTER (HCC): Primary | ICD-10-CM

## 2019-06-12 NOTE — TELEPHONE ENCOUNTER
Patient's work place needs a letter stating his restrictions, recovery time, if he is ok to drive. Currently working part-time from home, wishes to work approx 30 hours/week. Please fax letter to [facility],  at [phone]. Any questions, Alexandru Morgan can be reached at [phone].

## 2019-06-18 ENCOUNTER — TELEPHONE (OUTPATIENT)
Dept: NEUROSURGERY | Age: 49
End: 2019-06-18

## 2019-06-18 NOTE — TELEPHONE ENCOUNTER
Letter mailed to patient to [employer] without any restrictions. Discussed with patient. All questions were answered and patient verbalized understanding.

## 2019-06-18 NOTE — TELEPHONE ENCOUNTER
Patient states he spoke to his therapist.  He states the therapist did not feel he needed to have a Functional Capacity Test.  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 He denies any lifting or strenuous work. Would like to discuss the possibility of cancelling this test if you are agreeable. When message has been addressed, please route message to office pool not to original sender.

## 2019-10-03 ENCOUNTER — HOSPITAL ENCOUNTER (EMERGENCY)
Age: 49
Discharge: HOME | End: 2019-10-03
Payer: COMMERCIAL

## 2019-10-03 ENCOUNTER — APPOINTMENT (OUTPATIENT)
Dept: CT IMAGING | Age: 49
End: 2019-10-03
Payer: COMMERCIAL

## 2019-10-03 VITALS
OXYGEN SATURATION: 97 % | WEIGHT: 315 LBS | HEIGHT: 68 IN | RESPIRATION RATE: 18 BRPM | TEMPERATURE: 98.4 F | HEART RATE: 85 BPM | DIASTOLIC BLOOD PRESSURE: 80 MMHG | BODY MASS INDEX: 47.74 KG/M2 | SYSTOLIC BLOOD PRESSURE: 131 MMHG

## 2019-10-03 DIAGNOSIS — S16.1XXA ACUTE STRAIN OF NECK MUSCLE, INITIAL ENCOUNTER: ICD-10-CM

## 2019-10-03 DIAGNOSIS — S09.90XA CLOSED HEAD INJURY, INITIAL ENCOUNTER: Primary | ICD-10-CM

## 2019-10-03 DIAGNOSIS — J33.8 MAXILLARY SINUS POLYP: ICD-10-CM

## 2019-10-03 PROCEDURE — 70450 CT HEAD/BRAIN W/O DYE: CPT

## 2019-10-03 PROCEDURE — 99282 EMERGENCY DEPT VISIT SF MDM: CPT

## 2019-10-03 PROCEDURE — 72125 CT NECK SPINE W/O DYE: CPT

## 2019-10-03 ASSESSMENT — PAIN SCALES - GENERAL: PAINLEVEL_OUTOF10: 2

## 2020-01-17 ENCOUNTER — HOSPITAL ENCOUNTER (EMERGENCY)
Age: 50
Discharge: HOME | End: 2020-01-17
Payer: COMMERCIAL

## 2020-01-17 ENCOUNTER — APPOINTMENT (OUTPATIENT)
Dept: CT IMAGING | Age: 50
End: 2020-01-17
Payer: COMMERCIAL

## 2020-01-17 VITALS
TEMPERATURE: 97.9 F | SYSTOLIC BLOOD PRESSURE: 148 MMHG | OXYGEN SATURATION: 99 % | HEART RATE: 66 BPM | RESPIRATION RATE: 16 BRPM | BODY MASS INDEX: 47.74 KG/M2 | WEIGHT: 315 LBS | HEIGHT: 68 IN | DIASTOLIC BLOOD PRESSURE: 74 MMHG

## 2020-01-17 PROCEDURE — 99283 EMERGENCY DEPT VISIT LOW MDM: CPT

## 2020-01-17 PROCEDURE — 72125 CT NECK SPINE W/O DYE: CPT

## 2020-01-17 PROCEDURE — 70450 CT HEAD/BRAIN W/O DYE: CPT

## 2020-01-17 RX ORDER — IBUPROFEN 800 MG/1
800 TABLET ORAL EVERY 6 HOURS PRN
Qty: 21 TABLET | Refills: 0 | Status: SHIPPED | OUTPATIENT
Start: 2020-01-17 | End: 2020-11-02 | Stop reason: CLARIF

## 2020-01-17 ASSESSMENT — PAIN DESCRIPTION - FREQUENCY: FREQUENCY: CONTINUOUS

## 2020-01-17 ASSESSMENT — PAIN DESCRIPTION - LOCATION: LOCATION: HEAD;NECK

## 2020-01-17 ASSESSMENT — PAIN DESCRIPTION - DESCRIPTORS: DESCRIPTORS: ACHING;DISCOMFORT

## 2020-01-17 ASSESSMENT — PAIN DESCRIPTION - PAIN TYPE: TYPE: ACUTE PAIN

## 2020-01-17 ASSESSMENT — PAIN DESCRIPTION - ONSET: ONSET: ON-GOING

## 2020-01-17 ASSESSMENT — PAIN SCALES - GENERAL: PAINLEVEL_OUTOF10: 4

## 2020-01-17 ASSESSMENT — PAIN - FUNCTIONAL ASSESSMENT: PAIN_FUNCTIONAL_ASSESSMENT: PREVENTS OR INTERFERES SOME ACTIVE ACTIVITIES AND ADLS

## 2020-01-18 NOTE — ED PROVIDER NOTES
diagnosis and prognosis. Questions are answered at this time and they are agreeable with the plan.      --------------------------------- IMPRESSION AND DISPOSITION ---------------------------------    IMPRESSION  1. Injury of head, initial encounter    2. Abrasion    3. Fall, initial encounter    4.  Strain of neck muscle, initial encounter        DISPOSITION  Disposition: Discharge to home  Patient condition is good                  SONALI Tracy - ALTHEA  01/17/20 2040

## 2020-02-13 ENCOUNTER — HOSPITAL ENCOUNTER (OUTPATIENT)
Dept: GENERAL RADIOLOGY | Age: 50
Discharge: HOME | End: 2020-02-15
Payer: COMMERCIAL

## 2020-02-13 ENCOUNTER — HOSPITAL ENCOUNTER (OUTPATIENT)
Age: 50
Discharge: HOME | End: 2020-02-13
Payer: COMMERCIAL

## 2020-02-13 ENCOUNTER — HOSPITAL ENCOUNTER (OUTPATIENT)
Age: 50
Discharge: HOME | End: 2020-02-15
Payer: COMMERCIAL

## 2020-02-13 LAB
ALBUMIN SERPL-MCNC: 4.3 G/DL (ref 3.5–5.2)
ALP BLD-CCNC: 71 U/L (ref 40–129)
ALT SERPL-CCNC: 23 U/L (ref 0–40)
ANION GAP SERPL CALCULATED.3IONS-SCNC: 12 MMOL/L (ref 7–16)
AST SERPL-CCNC: 21 U/L (ref 0–39)
BASOPHILS ABSOLUTE: 0.05 E9/L (ref 0–0.2)
BASOPHILS RELATIVE PERCENT: 0.5 % (ref 0–2)
BILIRUB SERPL-MCNC: 0.3 MG/DL (ref 0–1.2)
BUN BLDV-MCNC: 13 MG/DL (ref 6–20)
CALCIUM SERPL-MCNC: 9.9 MG/DL (ref 8.6–10.2)
CHLORIDE BLD-SCNC: 101 MMOL/L (ref 98–107)
CHOLESTEROL, FASTING: 167 MG/DL (ref 0–199)
CO2: 26 MMOL/L (ref 22–29)
CREAT SERPL-MCNC: 0.9 MG/DL (ref 0.7–1.2)
EOSINOPHILS ABSOLUTE: 0.26 E9/L (ref 0.05–0.5)
EOSINOPHILS RELATIVE PERCENT: 2.8 % (ref 0–6)
GFR AFRICAN AMERICAN: >60
GFR NON-AFRICAN AMERICAN: >60 ML/MIN/1.73
GLUCOSE FASTING: 110 MG/DL (ref 74–99)
HCT VFR BLD CALC: 44 % (ref 37–54)
HDLC SERPL-MCNC: 40 MG/DL
HEMOGLOBIN: 14.9 G/DL (ref 12.5–16.5)
IMMATURE GRANULOCYTES #: 0.03 E9/L
IMMATURE GRANULOCYTES %: 0.3 % (ref 0–5)
LDL CHOLESTEROL CALCULATED: 89 MG/DL (ref 0–99)
LYMPHOCYTES ABSOLUTE: 2.41 E9/L (ref 1.5–4)
LYMPHOCYTES RELATIVE PERCENT: 26.4 % (ref 20–42)
MCH RBC QN AUTO: 30 PG (ref 26–35)
MCHC RBC AUTO-ENTMCNC: 33.9 % (ref 32–34.5)
MCV RBC AUTO: 88.5 FL (ref 80–99.9)
MONOCYTES ABSOLUTE: 0.68 E9/L (ref 0.1–0.95)
MONOCYTES RELATIVE PERCENT: 7.4 % (ref 2–12)
NEUTROPHILS ABSOLUTE: 5.71 E9/L (ref 1.8–7.3)
NEUTROPHILS RELATIVE PERCENT: 62.6 % (ref 43–80)
PDW BLD-RTO: 12.1 FL (ref 11.5–15)
PLATELET # BLD: 189 E9/L (ref 130–450)
PMV BLD AUTO: 10.5 FL (ref 7–12)
POTASSIUM SERPL-SCNC: 4.3 MMOL/L (ref 3.5–5)
RBC # BLD: 4.97 E12/L (ref 3.8–5.8)
SODIUM BLD-SCNC: 139 MMOL/L (ref 132–146)
TOTAL PROTEIN: 7.7 G/DL (ref 6.4–8.3)
TRIGLYCERIDE, FASTING: 189 MG/DL (ref 0–149)
TSH SERPL DL<=0.05 MIU/L-ACNC: 1.9 UIU/ML (ref 0.27–4.2)
VLDLC SERPL CALC-MCNC: 38 MG/DL
WBC # BLD: 9.1 E9/L (ref 4.5–11.5)

## 2020-02-13 PROCEDURE — 36415 COLL VENOUS BLD VENIPUNCTURE: CPT

## 2020-02-13 PROCEDURE — 85025 COMPLETE CBC W/AUTO DIFF WBC: CPT

## 2020-02-13 PROCEDURE — 72040 X-RAY EXAM NECK SPINE 2-3 VW: CPT

## 2020-02-13 PROCEDURE — 80061 LIPID PANEL: CPT

## 2020-02-13 PROCEDURE — 84443 ASSAY THYROID STIM HORMONE: CPT

## 2020-02-13 PROCEDURE — 80053 COMPREHEN METABOLIC PANEL: CPT

## 2020-02-17 ENCOUNTER — OFFICE VISIT (OUTPATIENT)
Dept: NEUROSURGERY | Age: 50
End: 2020-02-17
Payer: COMMERCIAL

## 2020-02-17 VITALS
SYSTOLIC BLOOD PRESSURE: 136 MMHG | BODY MASS INDEX: 47.74 KG/M2 | HEART RATE: 90 BPM | DIASTOLIC BLOOD PRESSURE: 83 MMHG | WEIGHT: 315 LBS | HEIGHT: 68 IN

## 2020-02-17 PROCEDURE — 1036F TOBACCO NON-USER: CPT | Performed by: PHYSICIAN ASSISTANT

## 2020-02-17 PROCEDURE — G8417 CALC BMI ABV UP PARAM F/U: HCPCS | Performed by: PHYSICIAN ASSISTANT

## 2020-02-17 PROCEDURE — 99212 OFFICE O/P EST SF 10 MIN: CPT | Performed by: PHYSICIAN ASSISTANT

## 2020-02-17 PROCEDURE — G8484 FLU IMMUNIZE NO ADMIN: HCPCS | Performed by: PHYSICIAN ASSISTANT

## 2020-02-17 PROCEDURE — G8427 DOCREV CUR MEDS BY ELIG CLIN: HCPCS | Performed by: PHYSICIAN ASSISTANT

## 2020-02-17 NOTE — PROGRESS NOTES
Post Operative Follow-up    Patient is status post:  Cervical fusion. estuardo op sited pain well. No arm pain. Hand numbness improving. Ambulating with a cane. Good bladder control    Physical Exam  Alert and Oriented X 3  PERRLA, EOMI  BOND 4/5  Wound: C/D/I    A/P: patient is s/p C5-6 ACDF. Doing well. X-rays stable. No restrictions.   F/u PRN

## 2020-02-21 ENCOUNTER — OFFICE VISIT (OUTPATIENT)
Dept: PRIMARY CARE CLINIC | Age: 50
End: 2020-02-21
Payer: COMMERCIAL

## 2020-02-21 VITALS
TEMPERATURE: 97.8 F | BODY MASS INDEX: 56.26 KG/M2 | WEIGHT: 315 LBS | HEART RATE: 80 BPM | DIASTOLIC BLOOD PRESSURE: 70 MMHG | SYSTOLIC BLOOD PRESSURE: 110 MMHG

## 2020-02-21 PROCEDURE — 99213 OFFICE O/P EST LOW 20 MIN: CPT | Performed by: INTERNAL MEDICINE

## 2020-02-21 PROCEDURE — G8427 DOCREV CUR MEDS BY ELIG CLIN: HCPCS | Performed by: INTERNAL MEDICINE

## 2020-02-21 PROCEDURE — G8484 FLU IMMUNIZE NO ADMIN: HCPCS | Performed by: INTERNAL MEDICINE

## 2020-02-21 PROCEDURE — G8417 CALC BMI ABV UP PARAM F/U: HCPCS | Performed by: INTERNAL MEDICINE

## 2020-02-21 PROCEDURE — 1036F TOBACCO NON-USER: CPT | Performed by: INTERNAL MEDICINE

## 2020-02-21 RX ORDER — PANTOPRAZOLE SODIUM 40 MG/1
40 TABLET, DELAYED RELEASE ORAL DAILY
Qty: 90 TABLET | Refills: 1 | Status: SHIPPED
Start: 2020-02-21 | End: 2020-11-06 | Stop reason: SDUPTHER

## 2020-02-21 RX ORDER — ATORVASTATIN CALCIUM 20 MG/1
10 TABLET, FILM COATED ORAL NIGHTLY
Qty: 45 TABLET | Refills: 1 | Status: SHIPPED
Start: 2020-02-21 | End: 2020-12-10 | Stop reason: SDUPTHER

## 2020-02-21 ASSESSMENT — PATIENT HEALTH QUESTIONNAIRE - PHQ9
2. FEELING DOWN, DEPRESSED OR HOPELESS: 0
1. LITTLE INTEREST OR PLEASURE IN DOING THINGS: 0
SUM OF ALL RESPONSES TO PHQ QUESTIONS 1-9: 0
SUM OF ALL RESPONSES TO PHQ QUESTIONS 1-9: 0
SUM OF ALL RESPONSES TO PHQ9 QUESTIONS 1 & 2: 0

## 2020-02-21 ASSESSMENT — ENCOUNTER SYMPTOMS
BLOOD IN STOOL: 0
SHORTNESS OF BREATH: 0
NAUSEA: 0
ABDOMINAL PAIN: 0
EYE DISCHARGE: 0

## 2020-02-21 NOTE — PROGRESS NOTES
Chief Complaint   Patient presents with    Hyperlipidemia     Here for recheck on Hyperlipidemia. Lab work done,here for review. Patient reports feeling well but is currently experiencing nasel drainage. HPI:  Patient is here for follow-up    Has nasal drainage - clear        Allergy and Medications are reviewed and updated. Past Medical History, Surgical History, and Family History has been reviewed and updated. Review of Systems:  Review of Systems   Constitutional: Negative for chills and fever. HENT: Positive for postnasal drip. Negative for congestion and ear pain. Eyes: Negative for discharge. Respiratory: Negative for shortness of breath (No new SOB). Cardiovascular: Negative for chest pain and leg swelling. Gastrointestinal: Negative for abdominal pain, blood in stool and nausea. Endocrine: Negative for polydipsia. Genitourinary: Negative for flank pain and hematuria. Musculoskeletal: Negative for myalgias and neck pain. Skin: Negative for rash. Neurological: Negative for dizziness and seizures. Hematological: Does not bruise/bleed easily. Psychiatric/Behavioral: Negative for hallucinations and suicidal ideas. Vitals:    02/21/20 1048   BP: 110/70   Pulse: 80   Temp: 97.8 °F (36.6 °C)   TempSrc: Oral   Weight: (!) 370 lb (167.8 kg)       Physical Exam  Vitals signs reviewed. Constitutional:       Appearance: He is well-developed. HENT:      Head: Normocephalic and atraumatic. Eyes:      Conjunctiva/sclera: Conjunctivae normal.      Pupils: Pupils are equal, round, and reactive to light. Neck:      Vascular: No JVD. Cardiovascular:      Rate and Rhythm: Normal rate and regular rhythm. Pulmonary:      Effort: Pulmonary effort is normal.      Breath sounds: Normal breath sounds. No rales. Abdominal:      General: Bowel sounds are normal.      Palpations: Abdomen is soft. Musculoskeletal: Normal range of motion.    Lymphadenopathy:      Cervical: No cervical adenopathy. Skin:     General: Skin is warm and dry. Neurological:      Mental Status: He is alert and oriented to person, place, and time. Psychiatric:         Behavior: Behavior normal.          Labs :    Lab Results   Component Value Date    WBC 9.1 02/13/2020    HGB 14.9 02/13/2020    HCT 44.0 02/13/2020     02/13/2020    CHOL 157 05/30/2017    TRIG 115 05/30/2017    HDL 40 02/13/2020    ALT 23 02/13/2020    AST 21 02/13/2020     02/13/2020    K 4.3 02/13/2020     02/13/2020    CREATININE 0.9 02/13/2020    BUN 13 02/13/2020    CO2 26 02/13/2020    TSH 1.900 02/13/2020    GLUF 110 (H) 02/13/2020    LABA1C 5.6 05/30/2017     Lab Results   Component Value Date    COLORU Yellow 05/30/2017    NITRU Negative 05/30/2017    GLUCOSEU Negative 05/30/2017    GLUCOSEU NEGATIVE 12/16/2011    KETUA Negative 05/30/2017    UROBILINOGEN 0.2 05/30/2017    BILIRUBINUR Negative 05/30/2017    [facility] NEGATIVE 12/16/2011           ASSESSMENT     Patient Active Problem List    Diagnosis Date Noted    Cervical herniated disc 02/14/2019    Herniation of cervical intervertebral disc     Myelopathy ([address]     Spinal stenosis of lumbar region without neurogenic claudication 01/22/2019    Cauda equina compression ([facility] [address] 01/20/2019    Hyperlipidemia 03/16/2018    GERD (gastroesophageal reflux disease) 03/16/2018    Morbid obesity ([address] 03/16/2018    JEFFREY on CPAP 03/16/2018        Diagnosis:     ICD-10-CM    1. Other hyperlipidemia E78.49 atorvastatin (LIPITOR) 20 MG tablet   2. Gastroesophageal reflux disease, esophagitis presence not specified K21.9 pantoprazole (PROTONIX) 40 MG tablet   3. Morbid obesity ([address] E66.01    4. S/P cervical spinal fusion - 2/2019 Z98.1        PLAN:      Labs are reviewed     Pt is stable on current medical treatment. Continue current treatment plan    Side effects/Adverse effects/Precautions are reviewed with patient.      Low salt, Low Carb diet an low fat diet  Continue medications as advised and take them regularly  Regular exercises as advised    Discussed natural and expected course of this diagnosis and need to alert me if symptoms do not follow expected course, or if any worse. Smoking cessation if applicable, discussed with patient. Adv for weight loss program       There are no Patient Instructions on file for this visit. Return in about 2 months (around 4/21/2020).

## 2020-07-22 ENCOUNTER — E-VISIT (OUTPATIENT)
Dept: FAMILY MEDICINE CLINIC | Age: 50
End: 2020-07-22

## 2020-07-24 ENCOUNTER — TELEPHONE (OUTPATIENT)
Dept: FAMILY MEDICINE CLINIC | Age: 50
End: 2020-07-24

## 2020-07-29 ENCOUNTER — TELEPHONE (OUTPATIENT)
Dept: FAMILY MEDICINE CLINIC | Age: 50
End: 2020-07-29

## 2020-11-02 ENCOUNTER — OFFICE VISIT (OUTPATIENT)
Dept: FAMILY MEDICINE CLINIC | Age: 50
End: 2020-11-02
Payer: COMMERCIAL

## 2020-11-02 VITALS
RESPIRATION RATE: 28 BRPM | SYSTOLIC BLOOD PRESSURE: 124 MMHG | HEART RATE: 88 BPM | TEMPERATURE: 98 F | BODY MASS INDEX: 47.74 KG/M2 | OXYGEN SATURATION: 96 % | DIASTOLIC BLOOD PRESSURE: 78 MMHG | HEIGHT: 68 IN | WEIGHT: 315 LBS

## 2020-11-02 PROBLEM — Z98.1 S/P CERVICAL SPINAL FUSION: Status: ACTIVE | Noted: 2020-11-02

## 2020-11-02 PROBLEM — M47.816 ARTHRITIS OF LUMBAR SPINE: Status: ACTIVE | Noted: 2020-11-02

## 2020-11-02 PROCEDURE — G8427 DOCREV CUR MEDS BY ELIG CLIN: HCPCS | Performed by: INTERNAL MEDICINE

## 2020-11-02 PROCEDURE — 99214 OFFICE O/P EST MOD 30 MIN: CPT | Performed by: INTERNAL MEDICINE

## 2020-11-02 PROCEDURE — G8417 CALC BMI ABV UP PARAM F/U: HCPCS | Performed by: INTERNAL MEDICINE

## 2020-11-02 PROCEDURE — G8484 FLU IMMUNIZE NO ADMIN: HCPCS | Performed by: INTERNAL MEDICINE

## 2020-11-02 PROCEDURE — 1036F TOBACCO NON-USER: CPT | Performed by: INTERNAL MEDICINE

## 2020-11-02 ASSESSMENT — ENCOUNTER SYMPTOMS
SORE THROAT: 0
SINUS PAIN: 0
SHORTNESS OF BREATH: 0
BLOOD IN STOOL: 0
ABDOMINAL PAIN: 0
NAUSEA: 0
EYE DISCHARGE: 0
EYE PAIN: 0
BACK PAIN: 1

## 2020-11-02 NOTE — PROGRESS NOTES
Chief Complaint   Patient presents with    Back Pain     Following up from this ongoing back pain that has yet to resovle, he is so much better than when he started but still having difficulty ambulating        HPI:  Patient is here for follow-up     Overall feeling well  Has chronic lower back pain, - \" no new\"  Need minimal assist for walking with herrera    \" back to work, but not much exercises\"     Has mild drop foot on right. Allergy and Medications are reviewed and updated. Past Medical History, Surgical History, and Family History has been reviewed and updated. Review of Systems:  Review of Systems   Constitutional: Negative for chills and fever. HENT: Negative for congestion, sinus pain and sore throat. Eyes: Negative for pain and discharge. Respiratory: Negative for shortness of breath (No new SOb). Cardiovascular: Negative for chest pain. Gastrointestinal: Negative for abdominal pain, blood in stool and nausea. Genitourinary: Negative for flank pain and frequency. Musculoskeletal: Positive for back pain and gait problem. Negative for neck pain. Hematological: Does not bruise/bleed easily. Psychiatric/Behavioral: Negative for suicidal ideas. Vitals:    11/02/20 0841   BP: 124/78   Pulse: 88   Resp: 28   Temp: 98 °F (36.7 °C)   TempSrc: Temporal   SpO2: 96%   Weight: (!) 391 lb (177.4 kg)   Height: 5' 8\" (1.727 m)       Physical Exam  Vitals signs reviewed. Constitutional:       Appearance: He is well-developed. HENT:      Head: Normocephalic and atraumatic. Eyes:      Conjunctiva/sclera: Conjunctivae normal.      Pupils: Pupils are equal, round, and reactive to light. Neck:      Vascular: No JVD. Cardiovascular:      Rate and Rhythm: Normal rate and regular rhythm. Pulmonary:      Effort: Pulmonary effort is normal.      Breath sounds: Normal breath sounds. No rales. Abdominal:      General: Bowel sounds are normal.      Palpations: Abdomen is soft. Musculoskeletal: Normal range of motion. Lymphadenopathy:      Cervical: No cervical adenopathy. Skin:     General: Skin is warm and dry. Neurological:      Mental Status: He is alert and oriented to person, place, and time. Comments: Mild foot drop on right  S/P cervical fusion     Has lower back pain   Psychiatric:         Behavior: Behavior normal.          Labs :    Lab Results   Component Value Date    WBC 9.1 02/13/2020    HGB 14.9 02/13/2020    HCT 44.0 02/13/2020     02/13/2020    CHOL 157 05/30/2017    TRIG 115 05/30/2017    HDL 40 02/13/2020    ALT 23 02/13/2020    AST 21 02/13/2020     02/13/2020    K 4.3 02/13/2020     02/13/2020    CREATININE 0.9 02/13/2020    BUN 13 02/13/2020    CO2 26 02/13/2020    TSH 1.900 02/13/2020    GLUF 110 (H) 02/13/2020    LABA1C 5.6 05/30/2017     Lab Results   Component Value Date    COLORU Yellow 05/30/2017    NITRU Negative 05/30/2017    GLUCOSEU Negative 05/30/2017    GLUCOSEU NEGATIVE 12/16/2011    KETUA Negative 05/30/2017    UROBILINOGEN 0.2 05/30/2017    BILIRUBINUR Negative 05/30/2017    [address] [place] [address] NEGATIVE 12/16/2011           ASSESSMENT     Patient Active Problem List    Diagnosis Date Noted    S/P cervical spinal fusion 11/02/2020    Arthritis of lumbar spine 11/02/2020    Cervical herniated disc 02/14/2019    Herniation of cervical intervertebral disc     Myel[facility] ([facility] [address]     Spinal stenosis of lumbar region without neurogenic claudication 01/22/2019    Cauda equina compression ([address] 01/20/2019    Hyperlipidemia 03/16/2018    GERD (gastroesophageal reflux disease) 03/16/2018    Morbid obesity ([address] 03/16/2018    JEFFREY on CPAP 03/16/2018        Diagnosis:     ICD-10-CM    1. Gait difficulty  R26.9 [facility]   2. S/P cervical spinal fusion  Z98.1 [address] [place], [place]   3.  Arthritis of lumbar spine Z29.732 Joya Sweeney [place], [facility] Medicine [facility] Rehabilitation, [place]   4. JEFFREY on CPAP  G47.33     Z99.89    5. Other hyperlipidemia  E78.49 Comprehensive Metabolic Panel, Fasting     Lipid, Fasting     TSH without Reflex     Urinalysis with Microscopic   6. Morbid obesity ([address]  E66.01 TSH without Reflex   7. Gastroesophageal reflux disease, unspecified whether esophagitis present  K21.9 CBC Auto Differential       PLAN:      Discussed with patient Agree for PMR referral    Pt is stable on current medical treatment. Continue current treatment plan    Side effects/Adverse effects/Precautions are reviewed with patient. Low salt, Low Carb diet an low fat diet  Continue medications as advised and take them regularly  Regular exercises as advised    Discussed natural and expected course of this diagnosis and need to alert me if symptoms do not follow expected course, or if any worse. Smoking cessation if applicable, discussed with patient. Advise to have ( Fasting) lab test prior to next visit. Adv for Flu vaccine - will take it at his pharmacy           There are no Patient Instructions on file for this visit. Return in about 1 month (around 12/2/2020) for Shen as virtual visit.

## 2020-11-06 RX ORDER — PANTOPRAZOLE SODIUM 40 MG/1
40 TABLET, DELAYED RELEASE ORAL DAILY
Qty: 90 TABLET | Refills: 1 | Status: SHIPPED
Start: 2020-11-06 | End: 2020-12-10 | Stop reason: SDUPTHER

## 2020-11-24 ENCOUNTER — OFFICE VISIT (OUTPATIENT)
Dept: PHYSICAL MEDICINE AND REHAB | Age: 50
End: 2020-11-24
Payer: COMMERCIAL

## 2020-11-24 VITALS
HEIGHT: 68 IN | BODY MASS INDEX: 47.74 KG/M2 | DIASTOLIC BLOOD PRESSURE: 80 MMHG | WEIGHT: 315 LBS | HEART RATE: 81 BPM | SYSTOLIC BLOOD PRESSURE: 160 MMHG

## 2020-11-24 DIAGNOSIS — E66.01 MORBID OBESITY (HCC): ICD-10-CM

## 2020-11-24 DIAGNOSIS — K21.9 GASTROESOPHAGEAL REFLUX DISEASE, UNSPECIFIED WHETHER ESOPHAGITIS PRESENT: ICD-10-CM

## 2020-11-24 DIAGNOSIS — E78.49 OTHER HYPERLIPIDEMIA: ICD-10-CM

## 2020-11-24 LAB
ALBUMIN SERPL-MCNC: 4.4 G/DL (ref 3.5–5.2)
ALP BLD-CCNC: 63 U/L (ref 40–129)
ALT SERPL-CCNC: 32 U/L (ref 0–40)
ANION GAP SERPL CALCULATED.3IONS-SCNC: 16 MMOL/L (ref 7–16)
AST SERPL-CCNC: 26 U/L (ref 0–39)
BACTERIA: NORMAL /HPF
BASOPHILS ABSOLUTE: 0.06 E9/L (ref 0–0.2)
BASOPHILS RELATIVE PERCENT: 0.6 % (ref 0–2)
BILIRUB SERPL-MCNC: 0.4 MG/DL (ref 0–1.2)
BILIRUBIN URINE: NEGATIVE
BLOOD, URINE: NEGATIVE
BUN BLDV-MCNC: 13 MG/DL (ref 6–20)
CALCIUM SERPL-MCNC: 10.1 MG/DL (ref 8.6–10.2)
CHLORIDE BLD-SCNC: 104 MMOL/L (ref 98–107)
CHOLESTEROL, FASTING: 137 MG/DL (ref 0–199)
CLARITY: CLEAR
CO2: 21 MMOL/L (ref 22–29)
COLOR: YELLOW
CREAT SERPL-MCNC: 1 MG/DL (ref 0.7–1.2)
EOSINOPHILS ABSOLUTE: 0.34 E9/L (ref 0.05–0.5)
EOSINOPHILS RELATIVE PERCENT: 3.2 % (ref 0–6)
GFR AFRICAN AMERICAN: >60
GFR NON-AFRICAN AMERICAN: >60 ML/MIN/1.73
GLUCOSE FASTING: 92 MG/DL (ref 74–99)
GLUCOSE URINE: NEGATIVE MG/DL
HCT VFR BLD CALC: 43.9 % (ref 37–54)
HDLC SERPL-MCNC: 34 MG/DL
HEMOGLOBIN: 14.3 G/DL (ref 12.5–16.5)
IMMATURE GRANULOCYTES #: 0.04 E9/L
IMMATURE GRANULOCYTES %: 0.4 % (ref 0–5)
KETONES, URINE: NEGATIVE MG/DL
LDL CHOLESTEROL CALCULATED: 81 MG/DL (ref 0–99)
LEUKOCYTE ESTERASE, URINE: NEGATIVE
LYMPHOCYTES ABSOLUTE: 2.75 E9/L (ref 1.5–4)
LYMPHOCYTES RELATIVE PERCENT: 26.1 % (ref 20–42)
MCH RBC QN AUTO: 29.4 PG (ref 26–35)
MCHC RBC AUTO-ENTMCNC: 32.6 % (ref 32–34.5)
MCV RBC AUTO: 90.3 FL (ref 80–99.9)
MONOCYTES ABSOLUTE: 0.74 E9/L (ref 0.1–0.95)
MONOCYTES RELATIVE PERCENT: 7 % (ref 2–12)
NEUTROPHILS ABSOLUTE: 6.61 E9/L (ref 1.8–7.3)
NEUTROPHILS RELATIVE PERCENT: 62.7 % (ref 43–80)
NITRITE, URINE: NEGATIVE
PDW BLD-RTO: 12.8 FL (ref 11.5–15)
PH UA: 6 (ref 5–9)
PLATELET # BLD: 216 E9/L (ref 130–450)
PMV BLD AUTO: 12.2 FL (ref 7–12)
POTASSIUM SERPL-SCNC: 4.7 MMOL/L (ref 3.5–5)
PROTEIN UA: NEGATIVE MG/DL
RBC # BLD: 4.86 E12/L (ref 3.8–5.8)
RBC UA: NORMAL /HPF (ref 0–2)
SODIUM BLD-SCNC: 141 MMOL/L (ref 132–146)
SPECIFIC GRAVITY UA: 1.02 (ref 1–1.03)
TOTAL PROTEIN: 7.5 G/DL (ref 6.4–8.3)
TRIGLYCERIDE, FASTING: 108 MG/DL (ref 0–149)
TSH SERPL DL<=0.05 MIU/L-ACNC: 1.61 UIU/ML (ref 0.27–4.2)
UROBILINOGEN, URINE: 0.2 E.U./DL
VLDLC SERPL CALC-MCNC: 22 MG/DL
WBC # BLD: 10.5 E9/L (ref 4.5–11.5)
WBC UA: NORMAL /HPF (ref 0–5)

## 2020-11-24 PROCEDURE — 1036F TOBACCO NON-USER: CPT | Performed by: PHYSICAL MEDICINE & REHABILITATION

## 2020-11-24 PROCEDURE — G8427 DOCREV CUR MEDS BY ELIG CLIN: HCPCS | Performed by: PHYSICAL MEDICINE & REHABILITATION

## 2020-11-24 PROCEDURE — 99204 OFFICE O/P NEW MOD 45 MIN: CPT | Performed by: PHYSICAL MEDICINE & REHABILITATION

## 2020-11-24 PROCEDURE — G8484 FLU IMMUNIZE NO ADMIN: HCPCS | Performed by: PHYSICAL MEDICINE & REHABILITATION

## 2020-11-24 PROCEDURE — G8417 CALC BMI ABV UP PARAM F/U: HCPCS | Performed by: PHYSICAL MEDICINE & REHABILITATION

## 2020-11-24 NOTE — PROGRESS NOTES
Barbi Love, [id number] [facility]  [address] [facility] [facility]  Phone: [phone]  Fax: [phone]    PCP: Miguel Dumont MD  Date of visit: 11/24/20    Chief Complaint   Patient presents with    Gait Problem     new patient    Neck Pain       Dear Dr. Marvin Dsouza,     Thank you for referring your patient to be seen. As you know,  Marissa Carr is a 52 y.o. male with past medical history as below who presents with trouble walking since January 2019. He had right lower extremity weakness initially. He was doing therapy and had sudden onset profound right arm and leg weakness. Initially diagnosed with cauda equina and transferred to [facility]. E's. MRI cervical revealed myelopathy and he underwent surgery. After surgery, he reports improvement, but still with residual right arm and leg weakness. Notices a slight foot drop. Only has occasional low back pain when exerting himself. The pain is rated Pain Score:   3 . No falls. No tripping or falling. Has a cane he uses in community. Spent months at [facility]. The prior workup has included: Xray, MRI C/L spine 2019    The prior treatment has included:  PT: daily HEP      No Known Allergies    Current Outpatient Medications   Medication Sig Dispense Refill    pantoprazole (PROTONIX) 40 MG tablet Take 1 tablet by mouth daily 90 tablet 1    Coenzyme Q10 (CO Q 10) 100 MG CAPS       Psyllium (GANESH-RUL PSYLLIUM SEED HUSKS) 500 MG CAPS       Multiple Vitamins-Minerals (MENS MULTIVITAMIN PLUS PO)       Specialty Vitamins Products (LUNG PO) Take by mouth      atorvastatin (LIPITOR) 20 MG tablet Take 0.5 tablets by mouth nightly Indications: one half tab daily 45 tablet 1    acetaminophen (TYLENOL) 325 MG tablet Take 650 mg by mouth every 6 hours as needed for Pain or Fever (pain 1-3 or fever >100)       No current facility-administered medications for this visit.         Past Medical History: Diagnosis Date    Cervical radiculopathy     GERD (gastroesophageal reflux disease)     Hyperlipidemia     Obesity     Onychomycosis     Quadriplegia, acute traumatic, initial encounter [facility])        Past Surgical History:   Procedure Laterality Date    CERVICAL FUSION N/A 2/14/2019    C5-C6  ANTERIOR CERVICAL DISCECTOMY AND FUSION -- NEEDS PLATES, SCREWS, REGULAR BED WITH HORSE SHOE - GLOBUS performed [place] [facility]anda James [place] [facility] [place]       Family History   Problem Relation Age of Onset    Diabetes Mother     High Cholesterol Father     Diabetes Maternal Grandmother     Diabetes Maternal Grandfather        Social History     Tobacco Use    Smoking status: Former Smoker    Smokeless tobacco: Never Used   Substance Use Topics    Alcohol use: Yes     Comment: rare    Drug use: No          Functional Status: The patient is able to ambulate and perform activities of daily living with the use of an assistive device. Occupation: The patient is currently employed. ROS: For more complete ROS answered by the patient, please see . Constitutional: Denies fevers, chills, night sweats, unintentional weight loss     Skin: Denies rash or skin changes     Eyes: Denies vision changes    Ears/Nose/Throat: Denies nasal congestion or sore throat     Respiratory: Denies SOB or cough     Cardiovascular: Denies CP, palpitations, edema      Gastrointestinal: Denies abdominal pain,  N/V, constipation, or diarrhea    Genitourinary: Denies urinary symptoms    Neurologic: See HPI.     MSK: See HPI. Psychiatric: Denies sleep disturbance, anxiety, depression    Hematologic/Lymphatic/Immunologic: Denies bruising       Physical Exam:   Blood pressure (!) 160/80, pulse 81, height 5' 8\" (1.727 m), weight (!) 394 lb (178.7 kg). General: well developed and well nourished in no acute distress. Body habitus is morbidly obese  HEENT: No rhinorrhea, sneezing, yawning, or lacrimation.  No scleral icterus or conjunctival injection. Resp: symmetrical chest expansion, unlabored breathing, respirations unlabored. CV: Heart rate is regular. Peripheral pulses are palpable  Lymph: No visible regional lymphadenopathy. Skin: No rashes or ecchymosis. Normal turgor. Psych: Mood is calm. Affect is normal.   Ext: No edema noted     MSK:   Back/Hip Exam:   Inspection: Pelvis was asymmetric. Lumbar lordotic curvature was normal. There was no scoliosis. No ecchymoses or erythema. Palpation: Palpatory exam revealed no tenderness along lumbosacral paraspinals, midline spine, SI joint sulcus, greater trochanters and TFL on both side. There was no paraspinal spasms. There were no trigger points. Special/provocative testing:   Supine SLR negative     Neurological Exam:  Strength:   R  L  Hip Flex  4+  5  Knee Ext  5 5  Ankle dorsi  5  5  EHL   5 5  Ankle Plantar  5  5    Sensory:  Intact for light touch in all lower extremity dermatomes except increase in sensation right lateral thigh and diminished right arm LT      Reflexes:   R  L  Patellar  (3+) (1+)  Ankle Jerk  (3+) (2+)    +clonus right ankle- chronic  +Campos      Gait- right hemiparetic spastic gait. Ambulated without AD. No foot drop. Imaging: (personally reviewed by me 11/24/20)  MRI C spine   MRI L spine       Impression:   Yaw Lay II is a 52 y.o. male     1. Right spastic hemiparesis (HCC)    2. Spinal cord injury, C5-C7, sequela ([address]        Plan:   · Discussed prognosis and recovery. Discussed his symptoms at this point are chronic. Spasticity may or may not progress. · Discussed to continue to use cane when in public. · Educated on spasticity. Discussed importance of stretching and to prevent contractures. Continue HEP. · Discussed treatment options for spasticity if needed such as anti-spasmodic, botox.  The patient was educated about the diagnosis, prognosis, indications, risks and benefits of treatment.  An opportunity to ask questions was given to the patient and questions were answered. The patient agreed to proceed with the recommended treatment as described above.  Follow up PRN     Thank you for the consultation and for allowing me to participate in the care of this patient.         Sincerely,     Arnaud Cuevas DO, [facility]   Board Certified Physical Medicine and Rehabilitation

## 2020-12-10 ENCOUNTER — VIRTUAL VISIT (OUTPATIENT)
Dept: FAMILY MEDICINE CLINIC | Age: 50
End: 2020-12-10
Payer: COMMERCIAL

## 2020-12-10 PROCEDURE — 99213 OFFICE O/P EST LOW 20 MIN: CPT | Performed by: INTERNAL MEDICINE

## 2020-12-10 PROCEDURE — G8427 DOCREV CUR MEDS BY ELIG CLIN: HCPCS | Performed by: INTERNAL MEDICINE

## 2020-12-10 RX ORDER — ATORVASTATIN CALCIUM 20 MG/1
TABLET, FILM COATED ORAL
Qty: 45 TABLET | Refills: 1 | OUTPATIENT
Start: 2020-12-10

## 2020-12-10 RX ORDER — ATORVASTATIN CALCIUM 20 MG/1
10 TABLET, FILM COATED ORAL NIGHTLY
Qty: 45 TABLET | Refills: 1 | Status: SHIPPED
Start: 2020-12-10 | End: 2021-07-19

## 2020-12-10 RX ORDER — PANTOPRAZOLE SODIUM 40 MG/1
40 TABLET, DELAYED RELEASE ORAL DAILY
Qty: 90 TABLET | Refills: 1 | Status: SHIPPED
Start: 2020-12-10 | End: 2021-07-26 | Stop reason: SDUPTHER

## 2020-12-10 ASSESSMENT — ENCOUNTER SYMPTOMS
BLOOD IN STOOL: 0
ABDOMINAL PAIN: 0
SHORTNESS OF BREATH: 0
EYE DISCHARGE: 0
NAUSEA: 0

## 2020-12-10 NOTE — PROGRESS NOTES
TeleMedicine Video Visit    This visit was performed as a virtual video visit using a synchronous, two-way, audio-video telehealth technology platform. Patient identification was verified at the start of the visit, including the patient's telephone number and physical location. I discussed with the patient the nature of our telehealth visits, that:     1. Due to the nature of an audio- video modality, the only components of a physical exam that could be done are the elements supported by direct observation. 2. I would evaluate the patient and recommend diagnostics and treatments based on my assessment. 3. If it was felt that the patient should be evaluated in clinic or an emergency room setting, then they would be directed there. 4. Our sessions are not being recorded and that personal health information is protected. 5. Our team would provide follow up care in person if/when the patient needs it. Patient does agree to proceed with telemedicine consultation. Patient's location: home address in [facility]  Physician  location [facility] other people involved in call- None       Chief Complaint   Patient presents with    Follow-up    Medication Refill       HPI:  Patient is a video visit    No complaints    Had labs done     Allergy and Medications are reviewed and updated. Past Medical History, Surgical History, and Family History has been reviewed and updated. Review of Systems:  Review of Systems   Constitutional: Negative for chills and fever. HENT: Negative for congestion and ear pain. Eyes: Negative for discharge. Respiratory: Negative for shortness of breath (No new SOB). Cardiovascular: Negative for chest pain and leg swelling. Gastrointestinal: Negative for abdominal pain, blood in stool and nausea. Endocrine: Negative for polydipsia. Genitourinary: Negative for flank pain and hematuria. Musculoskeletal: Negative for myalgias and neck pain. Skin: Negative for rash. Neurological: Negative for dizziness and seizures. Hematological: Does not bruise/bleed easily. Psychiatric/Behavioral: Negative for hallucinations and suicidal ideas. There were no vitals filed for this visit. Physical Exam  Vitals reviewed: Virtual visit. Denies Fever,    Constitutional:       Appearance: Normal appearance. He is not ill-appearing or diaphoretic. HENT:      Head: Normocephalic and atraumatic. Nose: Nose normal.   Eyes:      General:         Right eye: No discharge. Left eye: No discharge. Conjunctiva/sclera: Conjunctivae normal.   Pulmonary:      Effort: Pulmonary effort is normal. No respiratory distress. Skin:     General: Skin is dry. Coloration: Skin is not jaundiced. Neurological:      General: No focal deficit present. Mental Status: He is alert and oriented to person, place, and time. Mental status is at baseline. Psychiatric:         Mood and Affect: Mood normal.         Behavior: Behavior normal.         Thought Content:  Thought content normal.          Labs :    Lab Results   Component Value Date    WBC 10.5 11/24/2020    HGB 14.3 11/24/2020    HCT 43.9 11/24/2020     11/24/2020    CHOL 157 05/30/2017    TRIG 115 05/30/2017    HDL 34 11/24/2020    ALT 32 11/24/2020    AST 26 11/24/2020     11/24/2020    K 4.7 11/24/2020     11/24/2020    CREATININE 1.0 11/24/2020    BUN 13 11/24/2020    CO2 21 (L) 11/24/2020    TSH 1.610 11/24/2020    GLUF 92 11/24/2020    LABA1C 5.6 05/30/2017     Lab Results   Component Value Date    COLORU Yellow 11/24/2020    NITRU Negative 11/24/2020    GLUCOSEU Negative 11/24/2020    GLUCOSEU NEGATIVE 12/16/2011    KETUA Negative 11/24/2020    UROBILINOGEN 0.2 11/24/2020    BILIRUBINUR Negative 11/24/2020    [address] NEGATIVE 12/16/2011           ASSESSMENT     Patient Active Problem List    Diagnosis Date Noted    S/P cervical spinal fusion 11/02/2020    Arthritis of lumbar spine 11/02/2020    Cervical herniated disc 02/14/2019    Herniation of cervical intervertebral disc     Myelopathy ([facility] [address]     Spinal stenosis of lumbar region without neurogenic claudication 01/22/2019    Cauda equina compression ([facility] [address] 01/20/2019    Hyperlipidemia 03/16/2018    GERD (gastroesophageal reflux disease) 03/16/2018    Morbid obesity ([facility] [address] 03/16/2018    JEFFREY on CPAP 03/16/2018        Diagnosis:     ICD-10-CM    1. Other hyperlipidemia  E78.49 atorvastatin (LIPITOR) 20 MG tablet   2. Gastroesophageal reflux disease, unspecified whether esophagitis present  K21.9 pantoprazole (PROTONIX) 40 MG tablet   3. JEFFREY on CPAP  G47.33     Z99.89    4. S/P cervical spinal fusion  Z98.1    5. Morbid obesity ([facility]  E66.01        PLAN:      Had f/u with Dr Gill Ruelas and PMR    Consult is reviewed    RFs are sent    Labs are reviewed    Monitor BP and Keep a log, Bring it with next visit        Pt is stable on current medical treatment. Continue current treatment plan    Side effects/Adverse effects/Precautions are reviewed with patient. Low salt, Low Carb diet an low fat diet  Continue medications as advised and take them regularly  Regular exercises as advised    Discussed natural and expected course of this diagnosis and need to alert me if symptoms do not follow expected course, or if any worse. Smoking cessation if applicable, discussed with patient. There are no Patient Instructions on file for this visit. Return in about 2 months (around 2/10/2021). Time spent: Greater than Not billed by time    This visit was completed virtually using D[employer]. me

## 2021-03-02 ENCOUNTER — TELEPHONE (OUTPATIENT)
Dept: FAMILY MEDICINE CLINIC | Age: 51
End: 2021-03-02

## 2021-03-02 NOTE — TELEPHONE ENCOUNTER
Called and left message for patient to let him know that he missed his appointment with Dr. Ashley Thornton today. Asked him to call back to r/s.

## 2021-03-19 ENCOUNTER — OFFICE VISIT (OUTPATIENT)
Dept: FAMILY MEDICINE CLINIC | Age: 51
End: 2021-03-19
Payer: COMMERCIAL

## 2021-03-19 VITALS
HEART RATE: 101 BPM | WEIGHT: 315 LBS | BODY MASS INDEX: 58.84 KG/M2 | SYSTOLIC BLOOD PRESSURE: 129 MMHG | OXYGEN SATURATION: 97 % | TEMPERATURE: 98.2 F | DIASTOLIC BLOOD PRESSURE: 85 MMHG

## 2021-03-19 DIAGNOSIS — E78.49 OTHER HYPERLIPIDEMIA: Primary | ICD-10-CM

## 2021-03-19 DIAGNOSIS — K21.9 GASTROESOPHAGEAL REFLUX DISEASE, UNSPECIFIED WHETHER ESOPHAGITIS PRESENT: ICD-10-CM

## 2021-03-19 DIAGNOSIS — E66.01 MORBID OBESITY (HCC): ICD-10-CM

## 2021-03-19 DIAGNOSIS — Z99.89 OSA ON CPAP: ICD-10-CM

## 2021-03-19 DIAGNOSIS — G47.33 OSA ON CPAP: ICD-10-CM

## 2021-03-19 PROCEDURE — G8427 DOCREV CUR MEDS BY ELIG CLIN: HCPCS | Performed by: INTERNAL MEDICINE

## 2021-03-19 PROCEDURE — G8417 CALC BMI ABV UP PARAM F/U: HCPCS | Performed by: INTERNAL MEDICINE

## 2021-03-19 PROCEDURE — 1036F TOBACCO NON-USER: CPT | Performed by: INTERNAL MEDICINE

## 2021-03-19 PROCEDURE — 99213 OFFICE O/P EST LOW 20 MIN: CPT | Performed by: INTERNAL MEDICINE

## 2021-03-19 PROCEDURE — G8484 FLU IMMUNIZE NO ADMIN: HCPCS | Performed by: INTERNAL MEDICINE

## 2021-03-19 SDOH — ECONOMIC STABILITY: TRANSPORTATION INSECURITY
IN THE PAST 12 MONTHS, HAS THE LACK OF TRANSPORTATION KEPT YOU FROM MEDICAL APPOINTMENTS OR FROM GETTING MEDICATIONS?: NO

## 2021-03-19 SDOH — ECONOMIC STABILITY: TRANSPORTATION INSECURITY
IN THE PAST 12 MONTHS, HAS LACK OF TRANSPORTATION KEPT YOU FROM MEETINGS, WORK, OR FROM GETTING THINGS NEEDED FOR DAILY LIVING?: NO

## 2021-03-19 ASSESSMENT — ENCOUNTER SYMPTOMS
NAUSEA: 0
EYE DISCHARGE: 0
ABDOMINAL PAIN: 0
BLOOD IN STOOL: 0
SHORTNESS OF BREATH: 0

## 2021-03-19 ASSESSMENT — PATIENT HEALTH QUESTIONNAIRE - PHQ9
SUM OF ALL RESPONSES TO PHQ QUESTIONS 1-9: 0
2. FEELING DOWN, DEPRESSED OR HOPELESS: 0
SUM OF ALL RESPONSES TO PHQ QUESTIONS 1-9: 0

## 2021-03-19 NOTE — PATIENT INSTRUCTIONS
The medication list included in this document is our record of what you are currently taking, including any changes that were made at today's visit.  If you find any differences when compared to your medications at home, or have any questions that were not answered at your visit, please contact the office. Advise to have ( Fasting) lab test prior to next visit.

## 2021-03-19 NOTE — PROGRESS NOTES
Chief Complaint   Patient presents with    Hyperlipidemia     Here for recheck on Hyperlipidemia and Gerd. Patient reports feeling well without any complaiints. HPI:  Patient is here for follow-up     No complaints        Allergy and Medications are reviewed and updated. Past Medical History, Surgical History, and Family History has been reviewed and updated. Review of Systems:  Review of Systems   Constitutional: Negative for chills and fever. HENT: Negative for congestion and ear pain. Eyes: Negative for discharge. Respiratory: Negative for shortness of breath (No new SOB). Cardiovascular: Negative for chest pain and leg swelling. Gastrointestinal: Negative for abdominal pain, blood in stool and nausea. Endocrine: Negative for polydipsia. Genitourinary: Negative for flank pain and hematuria. Musculoskeletal: Negative for myalgias and neck pain. Skin: Negative for rash. Neurological: Negative for dizziness and seizures. Hematological: Does not bruise/bleed easily. Psychiatric/Behavioral: Negative for hallucinations and suicidal ideas. Vitals:    03/19/21 1503   BP: 129/85   Pulse: 101   Temp: 98.2 °F (36.8 °C)   TempSrc: Oral   SpO2: 97%   Weight: (!) 387 lb (175.5 kg)       Physical Exam  Vitals signs reviewed. Constitutional:       Appearance: He is well-developed. HENT:      Head: Normocephalic and atraumatic. Eyes:      Conjunctiva/sclera: Conjunctivae normal.      Pupils: Pupils are equal, round, and reactive to light. Neck:      Vascular: No JVD. Cardiovascular:      Rate and Rhythm: Normal rate and regular rhythm. Pulmonary:      Effort: Pulmonary effort is normal.      Breath sounds: Normal breath sounds. No rales. Abdominal:      General: Bowel sounds are normal.      Palpations: Abdomen is soft. Musculoskeletal: Normal range of motion. Lymphadenopathy:      Cervical: No cervical adenopathy. Skin:     General: Skin is warm and dry. as advised and take them regularly  Regular exercises as advised    Discussed natural and expected course of this diagnosis and need to alert me if symptoms do not follow expected course, or if any worse. Smoking cessation if applicable, discussed with patient. Patient Instructions   The medication list included in this document is our record of what you are currently taking, including any changes that were made at today's visit.  If you find any differences when compared to your medications at home, or have any questions that were not answered at your visit, please contact the office. Advise to have ( Fasting) lab test prior to next visit. Return in about 2 months (around 5/19/2021).

## 2021-07-18 DIAGNOSIS — E78.49 OTHER HYPERLIPIDEMIA: ICD-10-CM

## 2021-07-19 RX ORDER — ATORVASTATIN CALCIUM 20 MG/1
TABLET, FILM COATED ORAL
Qty: 45 TABLET | Refills: 0 | Status: SHIPPED
Start: 2021-07-19 | End: 2021-08-20 | Stop reason: SDUPTHER

## 2021-07-26 ENCOUNTER — PATIENT MESSAGE (OUTPATIENT)
Dept: FAMILY MEDICINE CLINIC | Age: 51
End: 2021-07-26

## 2021-07-26 DIAGNOSIS — K21.9 GASTROESOPHAGEAL REFLUX DISEASE, UNSPECIFIED WHETHER ESOPHAGITIS PRESENT: ICD-10-CM

## 2021-07-26 RX ORDER — PANTOPRAZOLE SODIUM 40 MG/1
40 TABLET, DELAYED RELEASE ORAL DAILY
Qty: 90 TABLET | Refills: 1 | Status: SHIPPED
Start: 2021-07-26 | End: 2021-08-20 | Stop reason: SDUPTHER

## 2021-07-26 NOTE — TELEPHONE ENCOUNTER
From: Mike Jacobs II  To: Iliana Jimenez MD  Sent: 7/26/2021 3:28 PM EDT  Subject: Prescription Question    Good afternoon! I was hoping that I could get a refill for my pantoprazole. I will be in aug 20. Also if I have blood work orders for that visit?   Thank you   Priyanka Broderick

## 2021-07-26 NOTE — TELEPHONE ENCOUNTER
Patient is requesting refill of pantoprazole. Order pended.      Last Appointment:  3/19/2021  Future Appointments   Date Time Provider Tom Hines   8/20/2021  3:00 PM Iliana Jimenez MD [facility]

## 2021-08-19 ENCOUNTER — HOSPITAL ENCOUNTER (OUTPATIENT)
Age: 51
Discharge: HOME | End: 2021-08-19
Payer: COMMERCIAL

## 2021-08-19 DIAGNOSIS — E78.49 OTHER HYPERLIPIDEMIA: ICD-10-CM

## 2021-08-19 LAB
ALBUMIN SERPL-MCNC: 4 G/DL (ref 3.5–5.2)
ALP BLD-CCNC: 64 U/L (ref 40–129)
ALT SERPL-CCNC: 27 U/L (ref 0–40)
ANION GAP SERPL CALCULATED.3IONS-SCNC: 7 MMOL/L (ref 7–16)
AST SERPL-CCNC: 21 U/L (ref 0–39)
BILIRUB SERPL-MCNC: 0.4 MG/DL (ref 0–1.2)
BUN BLDV-MCNC: 11 MG/DL (ref 6–20)
CALCIUM SERPL-MCNC: 9.6 MG/DL (ref 8.6–10.2)
CHLORIDE BLD-SCNC: 108 MMOL/L (ref 98–107)
CHOLESTEROL, FASTING: 164 MG/DL (ref 0–199)
CO2: 28 MMOL/L (ref 22–29)
CREAT SERPL-MCNC: 1 MG/DL (ref 0.7–1.2)
GFR AFRICAN AMERICAN: >60
GFR NON-AFRICAN AMERICAN: >60 ML/MIN/1.73
GLUCOSE FASTING: 112 MG/DL (ref 74–99)
HDLC SERPL-MCNC: 32 MG/DL
LDL CHOLESTEROL CALCULATED: 113 MG/DL (ref 0–99)
POTASSIUM SERPL-SCNC: 4.8 MMOL/L (ref 3.5–5)
SODIUM BLD-SCNC: 143 MMOL/L (ref 132–146)
TOTAL PROTEIN: 7.4 G/DL (ref 6.4–8.3)
TRIGLYCERIDE, FASTING: 97 MG/DL (ref 0–149)
VLDLC SERPL CALC-MCNC: 19 MG/DL

## 2021-08-19 PROCEDURE — 80061 LIPID PANEL: CPT

## 2021-08-19 PROCEDURE — 36415 COLL VENOUS BLD VENIPUNCTURE: CPT

## 2021-08-19 PROCEDURE — 80053 COMPREHEN METABOLIC PANEL: CPT

## 2021-08-20 ENCOUNTER — OFFICE VISIT (OUTPATIENT)
Dept: FAMILY MEDICINE CLINIC | Age: 51
End: 2021-08-20
Payer: COMMERCIAL

## 2021-08-20 VITALS
DIASTOLIC BLOOD PRESSURE: 72 MMHG | OXYGEN SATURATION: 97 % | HEART RATE: 104 BPM | HEIGHT: 68 IN | RESPIRATION RATE: 20 BRPM | SYSTOLIC BLOOD PRESSURE: 136 MMHG | TEMPERATURE: 97.5 F | WEIGHT: 315 LBS | BODY MASS INDEX: 47.74 KG/M2

## 2021-08-20 DIAGNOSIS — M62.81 RIGHT-SIDED MUSCLE WEAKNESS: ICD-10-CM

## 2021-08-20 DIAGNOSIS — Z12.11 COLON CANCER SCREENING: ICD-10-CM

## 2021-08-20 DIAGNOSIS — K21.9 GASTROESOPHAGEAL REFLUX DISEASE, UNSPECIFIED WHETHER ESOPHAGITIS PRESENT: ICD-10-CM

## 2021-08-20 DIAGNOSIS — E78.49 OTHER HYPERLIPIDEMIA: Primary | ICD-10-CM

## 2021-08-20 PROCEDURE — 1036F TOBACCO NON-USER: CPT | Performed by: INTERNAL MEDICINE

## 2021-08-20 PROCEDURE — 99214 OFFICE O/P EST MOD 30 MIN: CPT | Performed by: INTERNAL MEDICINE

## 2021-08-20 PROCEDURE — G8417 CALC BMI ABV UP PARAM F/U: HCPCS | Performed by: INTERNAL MEDICINE

## 2021-08-20 PROCEDURE — G8427 DOCREV CUR MEDS BY ELIG CLIN: HCPCS | Performed by: INTERNAL MEDICINE

## 2021-08-20 RX ORDER — PANTOPRAZOLE SODIUM 40 MG/1
40 TABLET, DELAYED RELEASE ORAL DAILY
Qty: 90 TABLET | Refills: 1 | Status: SHIPPED
Start: 2021-08-20 | End: 2021-12-10 | Stop reason: SDUPTHER

## 2021-08-20 RX ORDER — ATORVASTATIN CALCIUM 20 MG/1
TABLET, FILM COATED ORAL
Qty: 45 TABLET | Refills: 0 | Status: SHIPPED
Start: 2021-08-20 | End: 2022-04-11 | Stop reason: SDUPTHER

## 2021-08-20 ASSESSMENT — ENCOUNTER SYMPTOMS
EYE DISCHARGE: 0
BLOOD IN STOOL: 0
SHORTNESS OF BREATH: 0
NAUSEA: 0
ABDOMINAL PAIN: 0

## 2021-08-20 NOTE — PROGRESS NOTES
Chief Complaint   Patient presents with   [address] [facility] [address] Patient Kit for FIT Test     Medication Refill       HPI:  Patient is here for follow-up     Feeling well    No complaints        Allergy and Medications are reviewed and updated. Past Medical History, Surgical History, and Family History has been reviewed and updated. Review of Systems:  Review of Systems   Constitutional: Negative for chills and fever. HENT: Negative for congestion and ear pain. Eyes: Negative for discharge. Respiratory: Negative for shortness of breath (No new SOB). Cardiovascular: Negative for chest pain and leg swelling. Gastrointestinal: Negative for abdominal pain, blood in stool and nausea. Endocrine: Negative for polydipsia. Genitourinary: Negative for flank pain and hematuria. Musculoskeletal: Negative for myalgias and neck pain. Skin: Negative for rash. Neurological: Negative for dizziness and seizures. Hematological: Does not bruise/bleed easily. Psychiatric/Behavioral: Negative for hallucinations and suicidal ideas. Vitals:    08/20/21 1511 08/20/21 1520   BP: (!) 144/75 136/72   Pulse: 104    Resp: 20    Temp: 97.5 °F (36.4 °C)    TempSrc: Temporal    SpO2: 97%    Weight: (!) 386 lb 6.4 oz (175.3 kg)    Height: 5' 8\" (1.727 m)        Physical Exam  Vitals reviewed. Constitutional:       Appearance: He is well-developed. HENT:      Head: Normocephalic and atraumatic. Eyes:      Conjunctiva/sclera: Conjunctivae normal.      Pupils: Pupils are equal, round, and reactive to light. Neck:      Vascular: No JVD. Cardiovascular:      Rate and Rhythm: Normal rate and regular rhythm. Pulmonary:      Effort: Pulmonary effort is normal.      Breath sounds: Normal breath sounds. No rales. Abdominal:      General: Bowel sounds are normal.      Palpations: Abdomen is soft. Musculoskeletal:         General: Normal range of motion.    Lymphadenopathy:      Cervical: No

## 2021-12-10 ENCOUNTER — OFFICE VISIT (OUTPATIENT)
Dept: FAMILY MEDICINE CLINIC | Age: 51
End: 2021-12-10
Payer: COMMERCIAL

## 2021-12-10 ENCOUNTER — NURSE TRIAGE (OUTPATIENT)
Dept: OTHER | Facility: CLINIC | Age: 51
End: 2021-12-10

## 2021-12-10 VITALS
HEART RATE: 82 BPM | TEMPERATURE: 98 F | HEIGHT: 68 IN | BODY MASS INDEX: 47.74 KG/M2 | DIASTOLIC BLOOD PRESSURE: 80 MMHG | WEIGHT: 315 LBS | SYSTOLIC BLOOD PRESSURE: 138 MMHG | OXYGEN SATURATION: 96 %

## 2021-12-10 DIAGNOSIS — M54.12 CERVICAL RADICULOPATHY: Primary | ICD-10-CM

## 2021-12-10 DIAGNOSIS — Z12.11 COLON CANCER SCREENING: ICD-10-CM

## 2021-12-10 DIAGNOSIS — Z98.1 S/P CERVICAL SPINAL FUSION: ICD-10-CM

## 2021-12-10 DIAGNOSIS — K21.9 GASTROESOPHAGEAL REFLUX DISEASE, UNSPECIFIED WHETHER ESOPHAGITIS PRESENT: ICD-10-CM

## 2021-12-10 LAB
CONTROL: NORMAL
HEMOCCULT STL QL: NEGATIVE

## 2021-12-10 PROCEDURE — 82274 ASSAY TEST FOR BLOOD FECAL: CPT | Performed by: INTERNAL MEDICINE

## 2021-12-10 PROCEDURE — 99213 OFFICE O/P EST LOW 20 MIN: CPT | Performed by: INTERNAL MEDICINE

## 2021-12-10 PROCEDURE — G8484 FLU IMMUNIZE NO ADMIN: HCPCS | Performed by: INTERNAL MEDICINE

## 2021-12-10 PROCEDURE — G8417 CALC BMI ABV UP PARAM F/U: HCPCS | Performed by: INTERNAL MEDICINE

## 2021-12-10 PROCEDURE — G8427 DOCREV CUR MEDS BY ELIG CLIN: HCPCS | Performed by: INTERNAL MEDICINE

## 2021-12-10 PROCEDURE — 1036F TOBACCO NON-USER: CPT | Performed by: INTERNAL MEDICINE

## 2021-12-10 RX ORDER — MELOXICAM 15 MG/1
15 TABLET ORAL DAILY
Qty: 30 TABLET | Refills: 1 | Status: SHIPPED | OUTPATIENT
Start: 2021-12-10

## 2021-12-10 RX ORDER — METHYLPREDNISOLONE 4 MG/1
TABLET ORAL
Qty: 1 KIT | Refills: 0 | Status: SHIPPED | OUTPATIENT
Start: 2021-12-10

## 2021-12-10 RX ORDER — PANTOPRAZOLE SODIUM 40 MG/1
40 TABLET, DELAYED RELEASE ORAL DAILY
Qty: 90 TABLET | Refills: 1 | Status: SHIPPED
Start: 2021-12-10 | End: 2022-05-22 | Stop reason: SDUPTHER

## 2021-12-10 ASSESSMENT — ENCOUNTER SYMPTOMS
EYE DISCHARGE: 0
SHORTNESS OF BREATH: 0
NAUSEA: 0
ABDOMINAL PAIN: 0
BLOOD IN STOOL: 0

## 2021-12-10 NOTE — TELEPHONE ENCOUNTER
Received call from [facility] with Red Flag Complaint. Brief description of triage: Back Pain     Triage indicates for patient to See in office within 3 days     Care advice provided, patient verbalizes understanding; denies any other questions or concerns; instructed to call back for any new or worsening symptoms. Writer provided warm transfer to [facility] for appointment scheduling. Attention Provider: Thank you for allowing me to participate in the care of your patient. The patient was connected to triage in response to information provided to the ECC/PSC. Please do not respond through this encounter as the response is not directed to a shared pool. Reason for Disposition   Patient wants to be seen    Answer Assessment - Initial Assessment Questions  1. ONSET: \"When did the pain begin? \"       Last thurs, pt feels he \"slept wrong\" and had pain near C-7 to the left of spine, (pt had C5-6 herniation with discectomy and spinal damage)     2. LOCATION: \"Where does it hurt? \" (upper, mid or lower back)      About C-7 to the left of spine     3. SEVERITY: \"How bad is the pain? \"  (e.g., Scale 1-10; mild, moderate, or severe)    - MILD (1-3): doesn't interfere with normal activities     - MODERATE (4-7): interferes with normal activities or awakens from sleep     - SEVERE (8-10): excruciating pain, unable to do any normal activities       4/10    4. PATTERN: \"Is the pain constant? \" (e.g., yes, no; constant, intermittent)       More often then not, but comes and goes, pt can still move head left and right, previously has mobility limitations due to surgery and denies loss of ROM more than normal    5. RADIATION: \"Does the pain shoot into your legs or elsewhere? \"      Occasionally to L arm     6. CAUSE:  \"What do you think is causing the back pain? \"       Denies injury, feels \"might have slept wrong\"     7.  BACK OVERUSE:  Royce Leiva recent lifting of heavy objects, strenuous work or exercise? \"      Denies, works in an office     8. MEDICATIONS: \"What have you taken so far for the pain? \" (e.g., nothing, acetaminophen, NSAIDS)      Aleve, with little to know relief, pt tried icy hot with mild improvement, to states pain affects him the most when he's working     9. NEUROLOGIC SYMPTOMS: \"Do you have any weakness, numbness, or problems with bowel/bladder control? \"      Pt has weakness and tingling in L arm (secondary to surgery), unsure as to whether issue is worse with pain or not     10. OTHER SYMPTOMS: \"Do you have any other symptoms? \" (e.g., fever, abdominal pain, burning with urination, blood in urine)        Denies     11. PREGNANCY: \"Is there any chance you are pregnant? \" (e.g., yes, no; LMP)        N/a    Protocols used: BACK PAIN-ADULT-[place]

## 2021-12-10 NOTE — PROGRESS NOTES
Chief Complaint   Patient presents with    Neck Pain     pain started about a week ago        HPI:  Patient is here as same day visit  Has notice pain in upper back/neck area for one week, radiate to left arm       No injury or fall    \" I have new dog, he sleeps over my head neck area at night\"     Had C spine fusion in past        Allergy and Medications are reviewed and updated. Past Medical History, Surgical History, and Family History has been reviewed and updated. Review of Systems:  Review of Systems   Constitutional: Negative for chills and fever. HENT: Negative for congestion and ear pain. Eyes: Negative for discharge. Respiratory: Negative for shortness of breath (No new SOB). Cardiovascular: Negative for chest pain and leg swelling. Gastrointestinal: Negative for abdominal pain, blood in stool and nausea. Endocrine: Negative for polydipsia. Genitourinary: Negative for flank pain and hematuria. Musculoskeletal: Negative for myalgias and neck pain. Skin: Negative for rash. Neurological: Negative for dizziness and seizures. Hematological: Does not bruise/bleed easily. Psychiatric/Behavioral: Negative for hallucinations and suicidal ideas. Vitals:    12/10/21 1432 12/10/21 1435   BP: (!) 149/89 138/80   Position: Sitting    Pulse: 82    Temp: 98 °F (36.7 °C)    TempSrc: Temporal    SpO2: 96%    Weight: (!) 383 lb 3.2 oz (173.8 kg)    Height: 5' 8\" (1.727 m)        Physical Exam  Vitals reviewed. Constitutional:       Appearance: He is well-developed. HENT:      Head: Normocephalic and atraumatic. Eyes:      Conjunctiva/sclera: Conjunctivae normal.      Pupils: Pupils are equal, round, and reactive to light. Neck:      Vascular: No JVD. Cardiovascular:      Rate and Rhythm: Normal rate and regular rhythm. Pulmonary:      Effort: Pulmonary effort is normal.      Breath sounds: Normal breath sounds. No rales.    Abdominal:      General: Bowel sounds are normal. Palpations: Abdomen is soft. Musculoskeletal:         General: Normal range of motion. Cervical back: Spasms present. No tenderness or bony tenderness. Lymphadenopathy:      Cervical: No cervical adenopathy. Skin:     General: Skin is warm and dry. Neurological:      Mental Status: He is alert and oriented to person, place, and time. Comments: ? Residual weakness- Left Upper arm 4/5    Psychiatric:         Behavior: Behavior normal.          Labs :    Lab Results   Component Value Date    WBC 10.5 11/24/2020    HGB 14.3 11/24/2020    HCT 43.9 11/24/2020     11/24/2020    CHOL 157 05/30/2017    TRIG 115 05/30/2017    HDL 32 08/19/2021    ALT 27 08/19/2021    AST 21 08/19/2021     08/19/2021    K 4.8 08/19/2021     (H) 08/19/2021    CREATININE 1.0 08/19/2021    BUN 11 08/19/2021    CO2 28 08/19/2021    TSH 1.610 11/24/2020    GLUF 112 (H) 08/19/2021    LABA1C 5.6 05/30/2017     Lab Results   Component Value Date    COLORU Yellow 11/24/2020    NITRU Negative 11/24/2020    GLUCOSEU Negative 11/24/2020    GLUCOSEU NEGATIVE 12/16/2011    KETUA Negative 11/24/2020    UROBILINOGEN 0.2 11/24/2020    BILIRUBINUR Negative 11/24/2020    [facility] NEGATIVE 12/16/2011             ASSESSMENT     Patient Active Problem List    Diagnosis Date Noted    S/P cervical spinal fusion 11/02/2020    Arthritis of lumbar spine 11/02/2020    Cervical herniated disc 02/14/2019    Herniation of cervical intervertebral disc     [facility]opathy ([facility] [address]     Spinal stenosis of lumbar region without neurogenic claudication 01/22/2019    Cauda equina compression ([address] 01/20/2019    Hyperlipidemia 03/16/2018    GERD (gastroesophageal reflux disease) 03/16/2018    Morbid obesity ([address] 03/16/2018    JEFFREY on CPAP 03/16/2018        Diagnosis:     ICD-10-CM    1. Cervical radiculopathy  M54.12 XR CERVICAL SPINE (4-5 VIEWS)   2. S/P cervical spinal fusion  Z98.1 XR CERVICAL SPINE (4-5 VIEWS)   3. Gastroesophageal reflux disease, unspecified whether esophagitis present  K21.9 pantoprazole (PROTONIX) 40 MG tablet       PLAN:     Medrol dose pack  Mobic 15 mg qd prn    C spine XR    Pt is stable on current medical treatment. Continue current treatment plan    Side effects/Adverse effects/Precautions are reviewed with patient. Low salt, Low Carb diet an low fat diet  Continue medications as advised and take them regularly  Regular exercises as advised    Discussed natural and expected course of this diagnosis and need to alert me if symptoms do not follow expected course, or if any worse. Smoking cessation if applicable, discussed with patient. There are no Patient Instructions on file for this visit. Return in about 12 days (around 12/22/2021).

## 2021-12-13 ENCOUNTER — HOSPITAL ENCOUNTER (OUTPATIENT)
Dept: GENERAL RADIOLOGY | Age: 51
Discharge: HOME | End: 2021-12-15
Payer: COMMERCIAL

## 2021-12-13 ENCOUNTER — HOSPITAL ENCOUNTER (OUTPATIENT)
Age: 51
Discharge: HOME | End: 2021-12-15
Payer: COMMERCIAL

## 2021-12-13 DIAGNOSIS — Z98.1 S/P CERVICAL SPINAL FUSION: ICD-10-CM

## 2021-12-13 DIAGNOSIS — M54.12 CERVICAL RADICULOPATHY: ICD-10-CM

## 2021-12-13 PROCEDURE — 72050 X-RAY EXAM NECK SPINE 4/5VWS: CPT

## 2021-12-23 ENCOUNTER — OFFICE VISIT (OUTPATIENT)
Dept: FAMILY MEDICINE CLINIC | Age: 51
End: 2021-12-23
Payer: COMMERCIAL

## 2021-12-23 VITALS
TEMPERATURE: 97.6 F | HEART RATE: 79 BPM | BODY MASS INDEX: 47.74 KG/M2 | SYSTOLIC BLOOD PRESSURE: 128 MMHG | HEIGHT: 68 IN | DIASTOLIC BLOOD PRESSURE: 84 MMHG | WEIGHT: 315 LBS | OXYGEN SATURATION: 98 %

## 2021-12-23 DIAGNOSIS — M54.12 CERVICAL RADICULOPATHY: Primary | ICD-10-CM

## 2021-12-23 PROCEDURE — G8417 CALC BMI ABV UP PARAM F/U: HCPCS | Performed by: FAMILY MEDICINE

## 2021-12-23 PROCEDURE — G8427 DOCREV CUR MEDS BY ELIG CLIN: HCPCS | Performed by: FAMILY MEDICINE

## 2021-12-23 PROCEDURE — 99213 OFFICE O/P EST LOW 20 MIN: CPT | Performed by: FAMILY MEDICINE

## 2021-12-23 PROCEDURE — 1036F TOBACCO NON-USER: CPT | Performed by: FAMILY MEDICINE

## 2021-12-23 PROCEDURE — G8484 FLU IMMUNIZE NO ADMIN: HCPCS | Performed by: FAMILY MEDICINE

## 2021-12-23 ASSESSMENT — ENCOUNTER SYMPTOMS
RESPIRATORY NEGATIVE: 1
ALLERGIC/IMMUNOLOGIC NEGATIVE: 1
EYES NEGATIVE: 1
GASTROINTESTINAL NEGATIVE: 1

## 2021-12-23 NOTE — PROGRESS NOTES
OFFICE PROGRESS NOTE      SUBJECTIVE:        Patient ID:   Blanca Santoro is a 54 y.o. male who presents for   Chief Complaint   Patient presents with    Results     results of cervical spine done here alittle better but not all the way    [facility]     pt declined flu vaccine today            HPI:   Patient here for the follow-up from the neck pain  Patient states is feeling better  Is not able to raise the arm above the shoulder level  But the pain is better  Patient x-rays are within normal limits  There is no new injury  Patient has been taking Mobic        Prior to Visit Medications    Medication Sig Taking? Authorizing Provider   pantoprazole (PROTONIX) 40 MG tablet Take 1 tablet by mouth daily Yes Doug Boone MD   meloxicam (MOBIC) 15 MG tablet Take 1 tablet by mouth daily With meal Yes Doug Boone MD   atorvastatin (LIPITOR) 20 MG tablet take 1/2 tablet by mouth every evening Yes Doug Boone MD   Coenzyme Q10 (CO Q 10) 100 MG CAPS  Yes Historical Provider, MD   Psyllium (GANESH-RUL PSYLLIUM SEED HUSKS) 500 MG CAPS  Yes Historical Provider, MD   Multiple Vitamins-Minerals (MENS MULTIVITAMIN PLUS PO)  Yes Historical Provider, MD   Specialty Vitamins Products (LUNG PO) Take by mouth Yes Historical Provider, MD   methylPREDNISolone (MEDROL DOSEPACK) 4 MG tablet Take by mouth.   Patient not taking: Reported on 12/23/2021  Doug Boone MD      Social History     Socioeconomic History    Marital status:      Spouse name: None    Number of children: None    Years of education: None    Highest education level: None   Occupational History    None   Tobacco Use    Smoking status: Former Smoker    Smokeless tobacco: Never Used   Vaping Use    Vaping Use: Former   Substance and Sexual Activity    Alcohol use: Yes     Comment: rare    Drug use: No    Sexual activity: Yes     Partners: Female   Other Topics Concern    None Social History Narrative    None     Social Determinants of Health     Financial Resource Strain: Low Risk     Difficulty of Paying Living Expenses: Not hard at all   Food Insecurity: No Food Insecurity    Worried About Running Out of Food in the Last Year: Never true    Selena of Food in the Last Year: Never true   Transportation Needs: No Transportation Needs    Lack of Transportation (Medical): No    Lack of Transportation (Non-Medical): No   Physical Activity:     Days of Exercise per Week: Not on file    Minutes of Exercise per Session: Not on file   Stress:     Feeling of Stress : Not on file   Social Connections:     Frequency of Communication with Friends and Family: Not on file    Frequency of Social Gatherings with Friends and Family: Not on file    Attends Nondenominational Services: Not on file    Active Member of [address] [place] [address] [employer] or Organizations: Not on file    Attends Club or Organization Meetings: Not on file    Marital Status: Not on file   Intimate Partner Violence:     Fear of Current or Ex-Partner: Not on file    Emotionally Abused: Not on file    Physically Abused: Not on file    Sexually Abused: Not on file   Housing Stability:     Unable to Pay for Housing in the Last Year: Not on file    Number of [place] in the Last Year: Not on file    Unstable Housing in the Last Year: Not on file       I have reviewed Ko's allergies, medications, problem list, medical, social and family history and have updated as needed in the electronic medical record  Review Of Systems:    Review of Systems   Constitutional: Negative. HENT: Negative. Eyes: Negative. Respiratory: Negative. Cardiovascular: Negative. Gastrointestinal: Negative. Endocrine: Negative. Genitourinary: Negative. Musculoskeletal: Positive for neck pain (Improving). Allergic/Immunologic: Negative. Neurological: Negative. Hematological: Negative. Psychiatric/Behavioral: Negative. OBJECTIVE:     VS:  Wt Readings from Last 3 Encounters:   12/23/21 (!) 386 lb 3.2 oz (175.2 kg)   12/10/21 (!) 383 lb 3.2 oz (173.8 kg)   08/20/21 (!) 386 lb 6.4 oz (175.3 kg)     Temp Readings from Last 3 Encounters:   12/23/21 97.6 °F (36.4 °C) (Temporal)   12/10/21 98 °F (36.7 °C) (Temporal)   08/20/21 97.5 °F (36.4 °C) (Temporal)     BP Readings from Last 3 Encounters:   12/23/21 128/84   12/10/21 138/80   08/20/21 136/72        Physical Exam  Constitutional:       Appearance: He is well-developed. HENT:      Head: Normocephalic and atraumatic. Eyes:      Conjunctiva/sclera: Conjunctivae normal.      Pupils: Pupils are equal, round, and reactive to light. Cardiovascular:      Rate and Rhythm: Normal rate and regular rhythm. Heart sounds: Normal heart sounds. Pulmonary:      Effort: Pulmonary effort is normal.      Breath sounds: Normal breath sounds. Abdominal:      General: Bowel sounds are normal.      Palpations: Abdomen is soft. Musculoskeletal:         General: Normal range of motion. Cervical back: Normal range of motion and neck supple. Comments: Not able to read the about the shoulder   Skin:     General: Skin is warm and dry. Neurological:      Mental Status: He is alert and oriented to person, place, and time.    Psychiatric:         Behavior: Behavior normal.            Labs :    Lab Results   Component Value Date    WBC 10.5 11/24/2020    HGB 14.3 11/24/2020    HCT 43.9 11/24/2020     11/24/2020    CHOL 157 05/30/2017    TRIG 115 05/30/2017    HDL 32 08/19/2021    ALT 27 08/19/2021    AST 21 08/19/2021     08/19/2021    K 4.8 08/19/2021     (H) 08/19/2021    CREATININE 1.0 08/19/2021    BUN 11 08/19/2021    CO2 28 08/19/2021    TSH 1.610 11/24/2020    GLUF 112 (H) 08/19/2021    LABA1C 5.6 05/30/2017     Lab Results   Component Value Date    COLORU Yellow 11/24/2020    NITRU Negative 11/24/2020    GLUCOSEU Negative 11/24/2020    GLUCOSEU NEGATIVE 12/16/2011    KETUA Negative 11/24/2020    UROBILINOGEN 0.2 11/24/2020    BILIRUBINUR Negative 11/24/2020    BILIRUBINUR NEGATIVE 12/16/2011     No results found for: PSA, CEA, , SE4518,       Controlled Substances Monitoring:                                    ASSESSMENT     Patient Active Problem List    Diagnosis Date Noted    S/P cervical spinal fusion 11/02/2020    Arthritis of lumbar spine 11/02/2020    Cervical herniated disc 02/14/2019    Herniation of cervical intervertebral disc     Myelopathy ([address]     Spinal stenosis of lumbar region without neurogenic claudication 01/22/2019    Cauda equina compression ([address] 01/20/2019    Hyperlipidemia 03/16/2018    GERD (gastroesophageal reflux disease) 03/16/2018    Morbid obesity ([address] 03/16/2018    JEFFREY on CPAP 03/16/2018        Diagnosis:     ICD-10-CM    1. Cervical radiculopathy  M54.12 [facility], Moreno Andrews       PLAN:   Continues in the heat  Physical therapy ordered  Continue the medication  Return to clinic earlier if any problems        Patient Instructions   Continue same treatment  Warm compresses to the neck  Physical therapy has been ordered  Return to clinic earlier if any problems      Return Regular appointment. Misty Mcfadden reviewed my findings and recommendations with Kristel Baker.     Electronicallysigned by Barry Mtz MD on 12/23/21 at 9:48 AM EST

## 2021-12-23 NOTE — PATIENT INSTRUCTIONS
Continue same treatment  Warm compresses to the neck  Physical therapy has been ordered  Return to clinic earlier if any problems

## 2022-01-03 NOTE — PROGRESS NOTES
[facility]  Physical Therapy Daily Treatment Note    Date: 1/3/2022  Patient Name: Edwige Nguyen  : 1970   MRN: [id number]  DOInjury: na  DOSx: na   Referring Provider: Berto Barraza MD  [address]     Medical Diagnosis: Cervical radiculopathy (M54.12    Outcome Measure: NDI= 19, 38%    S: See eval  O: Pt given HEP  Time 08:00-08:42 2x/wk, 6 weeks    Visit      Pain 4/10     ROM      Modalities      MH + ES C/S             THEREX       Pulleys shoulder flex      Supine chest press wand      Supine shoulder flex wand                         UBE       Shrugs      Shoulder Press      Shldr abd DB      Shldr flex DB      Shoulder ER      Cerv ext isometric             THERAPEUTIC ACTIVITY Therapeutic activities for increased ROM for functional home tasks Large, functional, dynamic, global movements used to build strength, balance, endurance, and flexibility and to improve physical performance. ROWS: H      ROWS: M      ROWS: L      Low obliques with head follow      High obliques with head follow      Shoulder flexion with head follow      Punches                        A:  Tolerated well. Above added to written HEP.   P: Continue with rehab plan  Andrew Barrier, PT    Treatment Charges: Mins Units   Initial Evaluation 34 1   Re-Evaluation     Ther Exercise         TE 8 1   Manual Therapy     MT     Ther Activities        TA     Gait Training          GT     Neuro Re-education NR     Modalities     Non-Billable Service Time     Other     Total Time/Units 42 2

## 2022-01-03 NOTE — PROGRESS NOTES
[facility] OUTPATIENT REHABILITATION  PHYSICAL THERAPY INITIAL EVALUATION         Date:  2022   Patient: Jessica Ritter  : 1970  MRN: [id number]  Referring Provider: Jigna Hutchins MD  [address]     Medical Diagnosis:  Cervical radiculopathy (M54.12  Onset date: Dec woke up with a pinched nerve  Mechanism of Injury: Insidious onset  Chief complaint: Pain in C/S and left shoulder. Limited ROM left shoulder, Tingling in left arm. SUBJECTIVE:     Past Medical History  Past Medical History:   Diagnosis Date    Cervical radiculopathy     GERD (gastroesophageal reflux disease)     Hyperlipidemia     Obesity     Onychomycosis     Quadriplegia, acute traumatic, initial encounter [facility])      Past Surgical History:   Procedure Laterality Date    CERVICAL FUSION N/A 2019    C5-C6  ANTERIOR CERVICAL DISCECTOMY AND FUSION -- NEEDS PLATES, SCREWS, REGULAR BED WITH HORSE SHOE - GLOBUS performed by Agustin Franco MD at [facility] OR       Medications:   Current Outpatient Medications   Medication Sig Dispense Refill    pantoprazole (PROTONIX) 40 MG tablet Take 1 tablet by mouth daily 90 tablet 1    methylPREDNISolone (MEDROL DOSEPACK) 4 MG tablet Take by mouth. (Patient not taking: Reported on 2021) 1 kit 0    meloxicam (MOBIC) 15 MG tablet Take 1 tablet by mouth daily With meal 30 tablet 1    atorvastatin (LIPITOR) 20 MG tablet take 1/2 tablet by mouth every evening 45 tablet 0    Coenzyme Q10 (CO Q 10) 100 MG CAPS       Psyllium (GANESH-RUL PSYLLIUM SEED HUSKS) 500 MG CAPS       Multiple Vitamins-Minerals (MENS MULTIVITAMIN PLUS PO)       Specialty Vitamins Products (LUNG PO) Take by mouth       No current facility-administered medications for this encounter.        Imaging results: XR CERVICAL SPINE (4-5 VIEWS)    Result Date: 2021  EXAMINATION: XRAY VIEWS OF THE CERVICAL SPINE 2021 2:30 pm COMPARISON: 2020 HISTORY: ORDERING SYSTEM PROVIDED HISTORY: Cervical radiculopathy FINDINGS: Status post C4-C5 anterior fixation discectomy, without evidence of hardware complication. Alignment is unchanged, with unchanged minimal retrolisthesis of C4 on C5. Vertebral body heights are maintained. Multilevel degenerative disc disease noted. Bilateral neural foraminal narrowing within the superior cervical spine. No acute fracture. Prevertebral soft tissues are normal.     Stable appearance of cervical fixation hardware. Pain:  Current: 4/10     Best: 4/10     Worst:8/10    Symptom Type/Quality: dull, aching  Location[de-identified] Neck: left lateral neck with radiation to left arm, including triceps. Behavior: condition is improving      Provoking Activities/Positions: Head turns, sitting all day at work                 Relieving Activitie/Positions: Icy hot, heating pad     Disturbed Sleep:  Yes []    No [x]    Occupation: Counselor. Physical demands include: Keyboarding.   Status: Full Time      Hobbies: reading, guitar     Patient Goals: Pain control and To get my arm working    Medical Management for Current Problem:  [] Chiro  []  CT  []  Injection:    []  Meds:   []  MRI:  []  Ortho  []  PCP  []  PM&R  []  PT/OT  [x]  X-ray:  []  Other:     Contraindications/Precautions:     OBJECTIVE:     Inspection:  Standing    Cervical: Forward Head   [x] Normal   []Mild   [] Moderate   []Severe      Thoracic:         [x] Normal   [] Increased Kyphosis  [] Decreased Kyphosis    Lumbar:   [x] Normal   [] Increased Lordosis   [] Decreased Lordosis           Range of Motion:    Neck:       cerv   Flex=       WNL   Ext=          20 Deg   SB left=    WNL   SB right=  WNL   Rot left=    WNL   Rot right=   WNL    Upper Extremity:   Right:   [x] Normal   [] Limited    Left:   [] Normal   [x] Limited Shoulder             AROM                                             PROM     flex=      46 Deg                               flex=     WNL    abd =    79 Deg abd =   WNL         ir=     to L4 spinal level    Elbow    WNL    Wrist    WNL      Strength:     Neck: 4/5   R UE: 5/5   L UE:   Shoulder     flex=      3-/5   abd =      3-/5        ir=      4/5                 Er=      3-/5     Elbow    flex =   5/5   ext=     5/5     = 80 lbs      Palpation: Tender to palpation left UT, medial scapula border    Sensation: intact to light touch  . Reports intermittent tingling    Special Tests:   ND       ASSESSMENT     Patient has  limited cerv ROM and strength and decreased LUE strength which limits  his ability to perform overhead work and safely operate a motor vehicle. Treatment will focus of regaining ROM and strength of C/S and LUE to aid in resumption of safe functional activities and safe operation of motor vehicle    Problems:    1. Pain reported 4-8/10  2. Decreased ROM C/S and shoulder elevation  3. Decreased Strength LUE  4. Decreased functional ability with   lifting, pushing, pulling     [x] There are no barriers affecting plan of care or recovery    [] Barriers to this patient's plan of care or recovery include. Domestic Concerns:  [x] No  [] Yes:        Short Term goals (3 weeks)  1. Decrease reported pain to 5/10  2. Increase AROM left shoulder flex to 120 DEG  3. Increase Strength by 1/3 mm grade  4. Independent with Home Exercise Programs  Long Term goals (6 weeks)  1. Decrease reported pain to 0-2/10  2. Increase AROM left shoulder  to WNL  3. Increase Strength to 4/5 LUE   4. Able to perform/complete the following functions/tasks:     lifting, pushing, pulling,  head turns, driving      Outcome Measure: NDI =19, 38%    Rehab Potential: [x] Good  [] Fair  [] Poor    PLAN       Treatment will focus on HEP to improve ROM and PRE to improve cervical muscle and LUE in hopes of improving stability of the C/S with functional tasks at home and work. MH and ES  will be used for pain control. 2x/wk for 6 weeks.     Specific Provider Orders:Eval and treat    Physical therapy plan of care is established based on physician order, patient diagnosis and clinical assessment. Treatment Plan:  [x] Therapeutic Exercise  [x] Therapeutic Activity  [] Neuromuscular Re-education   [] Gait Training  [] Balance Training  [] Aerobic conditioning  [] Manual Therapy  [] Massage/Fascial release   [] Work/Sport specific activities   [x] Cold/hotpack  [] Vasocompression  [x] Electrical Stimulation  [] Lumbar/Cervical Traction  [] Ultrasound   [] Iontophoresis: 4 mg/mL Dexamethasone Sodium Phosphate 40-80 mAmin    [x] Instruction in HEP           Suggested Professional Referral: [x] No  [] Yes:     The following CPT codes are likely to be used in the care of this patient: 60575 PT Evaluation: Moderate Complexity   93641 Therapeutic Exercise   97852 Therapeutic Activities    Electrical Stimulation    Patient Education:  [x] Plans/Goals, Risks/Benefits discussed  [x] Home exercise program  Method of Education: [x] Verbal  [x] Demo  [x] Written  Comprehension of Education:  [x] Verbalizes understanding. [x] Demonstrates understanding. [] Needs Review. [] Demonstrates/verbalizes understanding of HEP/Ed previously given. Frequency:   2 times per week for 6 weeks    Patient understands diagnosis/prognosis and consents to treatment, plan and goals: [x] Yes    [] No     Thank you for the opportunity to work with your patient. If you have questions or comments, please contact me at [phone]; fax: [phone]. Electronically signed by: Gabe Klein PT         Please sign Physician's Certification and return to:  [address] [place] [id number]  Dept: [phone]  Dept Fax: [phone] Certification / Comments     Frequency/Duration 2  / week for  6 weeks.    Certification period From: 1/4/2022  To: 3/04/2022    I have reviewed the Plan of Care established for skilled therapy services and certify that the services are required and that they will be provided while the patient is under my care.     Physician's Comments/Revisions:               Physician's Printed Name:                                           [de-identified] Signature:                                                               Date:

## 2022-01-04 ENCOUNTER — HOSPITAL ENCOUNTER (OUTPATIENT)
Dept: PHYSICAL THERAPY | Age: 52
Setting detail: THERAPIES SERIES
Discharge: HOME | End: 2022-01-04
Payer: COMMERCIAL

## 2022-01-04 PROCEDURE — 97162 PT EVAL MOD COMPLEX 30 MIN: CPT | Performed by: PHYSICAL THERAPIST

## 2022-01-04 PROCEDURE — 97110 THERAPEUTIC EXERCISES: CPT | Performed by: PHYSICAL THERAPIST

## 2022-01-11 ENCOUNTER — HOSPITAL ENCOUNTER (OUTPATIENT)
Dept: PHYSICAL THERAPY | Age: 52
Setting detail: THERAPIES SERIES
Discharge: HOME | End: 2022-01-11
Payer: COMMERCIAL

## 2022-01-11 PROCEDURE — 97530 THERAPEUTIC ACTIVITIES: CPT | Performed by: PHYSICAL THERAPIST

## 2022-01-11 PROCEDURE — 97110 THERAPEUTIC EXERCISES: CPT | Performed by: PHYSICAL THERAPIST

## 2022-01-11 PROCEDURE — G0283 ELEC STIM OTHER THAN WOUND: HCPCS | Performed by: PHYSICAL THERAPIST

## 2022-01-11 NOTE — PROGRESS NOTES
[facility]  Physical Therapy Daily Treatment Note    Date: 2022  Patient Name: Julio C Mcneil  : 1970   MRN: [id number]  DOInjury: na  DOSx: na   Referring Provider: Crista Burciaga MD  [address]     Medical Diagnosis: Cervical radiculopathy (M54.12    Outcome Measure: NDI= 19, 38%    S: Pt reports compliance with HEP  O: Pt given HEP  Time 08:05-09:00 2x/wk, 6 weeks    Visit      Pain 4/10     ROM      Modalities      MH + ES C/S X 15 min            THEREX       Pulleys shoulder flex X 2  min     Supine chest press wand 2# x 2 min     Supine shoulder flex wand 2# x 2 min                        UBE  L4 x 2min fwd  x2 min bkwd     Shrugs 5# x 20     Shoulder Press      Shldr abd DB 1# 2x10     Shldr flex DB 1# 2x10     Shoulder ER G x 20      Shoulder IR G x 20            THERAPEUTIC ACTIVITY Therapeutic activities for increased ROM for functional home tasks Large, functional, dynamic, global movements used to build strength, balance, endurance, and flexibility and to improve physical performance. Pull downs G x 20     ROWS: M G x 20     ROWS: L G x 20     Low obliques with head follow      High obliques with head follow      Shoulder flexion with head follow      Punches G x 20                       A:  Tolerated well.    Overhead motion with shoulder hiking left  P: Continue with rehab plan  Rosevelt Labor, PT    Treatment Charges: Mins Units   Initial Evaluation       Re-Evaluation     Ther Exercise         TE  32 2    Manual Therapy     MT     Ther Activities        TA 8 1   Gait Training          GT     Neuro Re-education NR     Modalities 15 1   Non-Billable Service Time     Other     Total Time/Units 55  4

## 2022-01-13 ENCOUNTER — HOSPITAL ENCOUNTER (OUTPATIENT)
Dept: PHYSICAL THERAPY | Age: 52
Setting detail: THERAPIES SERIES
Discharge: HOME | End: 2022-01-13
Payer: COMMERCIAL

## 2022-01-13 PROCEDURE — 97110 THERAPEUTIC EXERCISES: CPT | Performed by: PHYSICAL THERAPIST

## 2022-01-13 PROCEDURE — G0283 ELEC STIM OTHER THAN WOUND: HCPCS | Performed by: PHYSICAL THERAPIST

## 2022-01-13 PROCEDURE — 97530 THERAPEUTIC ACTIVITIES: CPT | Performed by: PHYSICAL THERAPIST

## 2022-01-13 NOTE — PROGRESS NOTES
[facility]  Physical Therapy Daily Treatment Note    Date: 2022  Patient Name: Natalie Wright  : 1970   MRN: [id number]  [facility]: na  DOSx: na   Referring Provider: Jackie Sales MD  [address]     Medical Diagnosis: Cervical radiculopathy (M54.12     Outcome Measure: NDI= 19, 38%    S: Pt reports compliance with HEP  O: Added overhead press and wand abd  Time 08:03-09:03 2x/wk, 6 weeks LUE   Visit 3/12     Pain 4/10     ROM      Modalities      MH + ES C/S X 15 min            THEREX       Pulleys shoulder flex X 2  min     Supine chest press wand 2# x 2 min     Supine shoulder flex wand 2# x 2 min     overhead press wand X 2 min     shldr abd with wand X 2 min           UBE  L4 x 2min fwd  x2 min bkwd     Shrugs 5# x 20     Shoulder Press      Shldr abd DB 1# 2x10     Shldr flex DB 1# 2x10     Shoulder ER G x 20      Shoulder IR G x 20            THERAPEUTIC ACTIVITY Therapeutic activities for increased ROM for functional home tasks Large, functional, dynamic, global movements used to build strength, balance, endurance, and flexibility and to improve physical performance. Pull downs G x 20     ROWS: M G x 20     ROWS: L G x 20     Low obliques with head follow      High obliques with head follow      Shoulder flexion with head follow      Punches G x 20                       A:  Tolerated well.    Overhead motion with shoulder hiking left  P: Continue with rehab plan  Walter Morales PT    Treatment Charges: Mins Units   Initial Evaluation       Re-Evaluation     Ther Exercise         TE  37 2    Manual Therapy     MT     Ther Activities        TA 8 1   Gait Training          GT     Neuro Re-education NR     Modalities 15 1   Non-Billable Service Time     Other     Total Time/Units 60 4

## 2022-01-18 ENCOUNTER — HOSPITAL ENCOUNTER (OUTPATIENT)
Dept: PHYSICAL THERAPY | Age: 52
Setting detail: THERAPIES SERIES
Discharge: HOME | End: 2022-01-18
Payer: COMMERCIAL

## 2022-01-18 NOTE — PROGRESS NOTES
Physical Therapy Progress Note    Date: 2022  Patient Name: Martha Loaiza  : 1970   MRN: [id number]    Pt called to cancel PT appt. Today.  tayler Ramey, PT

## 2022-01-20 ENCOUNTER — HOSPITAL ENCOUNTER (OUTPATIENT)
Dept: PHYSICAL THERAPY | Age: 52
Setting detail: THERAPIES SERIES
Discharge: HOME | End: 2022-01-20
Payer: COMMERCIAL

## 2022-01-20 PROCEDURE — G0283 ELEC STIM OTHER THAN WOUND: HCPCS | Performed by: PHYSICAL THERAPIST

## 2022-01-20 PROCEDURE — 97530 THERAPEUTIC ACTIVITIES: CPT | Performed by: PHYSICAL THERAPIST

## 2022-01-20 PROCEDURE — 97110 THERAPEUTIC EXERCISES: CPT | Performed by: PHYSICAL THERAPIST

## 2022-01-20 NOTE — PROGRESS NOTES
[facility]  Physical Therapy Daily Treatment Note    Date: 2022  Patient Name: Rafael Medina  : 1970   MRN: [id number]  [facility]: na  DOSx: na   Referring Provider: Hannah Liz MD  [address]     Medical Diagnosis: Cervical radiculopathy (M54.12     Outcome Measure: NDI= 19, 38%    S: Pt reports compliance with HEP  O: Added overhead press and wand abd  Time 08:02-09:07 2x/wk, 6 weeks LUE   Visit      Pain 4/10     ROM      Modalities      MH + ES C/S X 15 min            THEREX       Pulleys shoulder flex X 2  min     Supine chest press wand 5# x 2 min     Supine shoulder flex wand 2# x 2 min     overhead press wand X 2 min     shldr abd with wand X 2 min           UBE  L4 x 2min fwd  x2 min bkwd     Shrugs 5# x 20     Shoulder Press      Shldr abd DB 1# 2x10     Shldr flex DB 1# 2x10     Shoulder ER G x 20      Shoulder IR G x 20            THERAPEUTIC ACTIVITY Therapeutic activities for increased ROM for functional home tasks Large, functional, dynamic, global movements used to build strength, balance, endurance, and flexibility and to improve physical performance. Pull downs G x 20     ROWS: M G x 20     ROWS: L G x 20     Low obliques with head follow      High obliques with head follow      Shoulder flexion with head follow      Punches G x 20                       A:  Tolerated well.    Overhead motion with shoulder hiking left  P: Continue with rehab plan  Josette Charles PT    Treatment Charges: Mins Units   Initial Evaluation       Re-Evaluation     Ther Exercise         TE  37 2    Manual Therapy     MT     Ther Activities        TA 8 1   Gait Training          GT     Neuro Re-education NR     Modalities 15 1   Non-Billable Service Time     Other     Total Time/Units 60 4

## 2022-01-25 ENCOUNTER — HOSPITAL ENCOUNTER (OUTPATIENT)
Dept: PHYSICAL THERAPY | Age: 52
Setting detail: THERAPIES SERIES
Discharge: HOME | End: 2022-01-25
Payer: COMMERCIAL

## 2022-01-25 NOTE — FLOWSHEET NOTE
Physical Therapy - Cancellation    1/25/2022    MRN: [id number]  Ko Gutierrez      Patient called to cancel appointment. Scheduled to work.      EMMANUEL Cage, PTA

## 2022-01-27 ENCOUNTER — HOSPITAL ENCOUNTER (OUTPATIENT)
Dept: PHYSICAL THERAPY | Age: 52
Setting detail: THERAPIES SERIES
Discharge: HOME | End: 2022-01-27
Payer: COMMERCIAL

## 2022-01-27 NOTE — PROGRESS NOTES
Physical Therapy Progress Note    Date: 2022  Patient Name: Dunia Cox  : 1970   MRN: [id number]    Pt called to cancel PT appt. Today.  He has to work    Minda Hatfield, PT

## 2022-02-01 ENCOUNTER — HOSPITAL ENCOUNTER (OUTPATIENT)
Dept: PHYSICAL THERAPY | Age: 52
Setting detail: THERAPIES SERIES
Discharge: HOME | End: 2022-02-01
Payer: COMMERCIAL

## 2022-02-01 PROCEDURE — 97530 THERAPEUTIC ACTIVITIES: CPT

## 2022-02-01 PROCEDURE — 97110 THERAPEUTIC EXERCISES: CPT

## 2022-02-01 NOTE — PROGRESS NOTES
[facility]  Physical Therapy Daily Treatment Note    Date: 2022  Patient Name: Everton Guzmán  : 1970   MRN: [id number]  DOInjury: na  DOSx: na   Referring Provider: Marcus Husain MD  [address]     Medical Diagnosis: Cervical radiculopathy (M54.12     Outcome Measure: NDI= 19, 38%    S: Pt reports compliance with HEP, tingling sensation when WB through left UE, pain radiates from cervical vertebrae to left shoulder  O:   Time 08:06-8:46 2x/wk, 6 weeks LUE   Visit      Pain 10     ROM      Modalities                 THEREX       Pulleys shoulder flex X 2  min     Supine chest press wand 5# x 2 min     Supine shoulder flex wand 2# x 2 min     overhead press wand X 2 min     shldr abd with wand X 2 min               Shrugs 5# x 20     Shoulder Press      Shldr abd DB 1# 2x10     Shldr flex DB 1# 2x10     Shoulder ER G x 20      Shoulder IR G x 20            THERAPEUTIC ACTIVITY Therapeutic activities for increased ROM for functional home tasks Large, functional, dynamic, global movements used to build strength, balance, endurance, and flexibility and to improve physical performance. Pull downs G x 20     ROWS: M G x 20     ROWS: L G x 20     Low obliques with head follow      High obliques with head follow      Shoulder flexion with head follow      Punches G x 20                       A:  Tolerated well. Overhead motion with shoulder hiking left. Patient missed UBE and modalites due to another appointment.     P: Continue with rehab plan  EMMANUEL Montgomery PTA    Treatment Charges: Mins Units   Initial Evaluation       Re-Evaluation     Ther Exercise         TE  30 2    Manual Therapy     MT     Ther Activities        TA 10 1   Gait Training          GT     Neuro Re-education NR     Modalities     Non-Billable Service Time     Other     Total Time/Units 40 3

## 2022-02-03 ENCOUNTER — HOSPITAL ENCOUNTER (OUTPATIENT)
Dept: PHYSICAL THERAPY | Age: 52
Setting detail: THERAPIES SERIES
Discharge: HOME | End: 2022-02-03
Payer: COMMERCIAL

## 2022-02-03 NOTE — FLOWSHEET NOTE
Physical Therapy - Cancellation    2/3/2022    MRN: [id number]  Ko Gutierrez      Patient called to cancel appointment. Patient feels off balance today.      Lindsey Rodriguez, EMMANUEL Regalado, PTA

## 2022-02-08 ENCOUNTER — HOSPITAL ENCOUNTER (OUTPATIENT)
Dept: PHYSICAL THERAPY | Age: 52
Setting detail: THERAPIES SERIES
Discharge: HOME | End: 2022-02-08
Payer: COMMERCIAL

## 2022-02-08 NOTE — PROGRESS NOTES
Physical Therapy - Cancellation    2/8/2022    MRN: [id number]  Ko Gutierrez      Patient called to cancel appointment. He has sick children at home. They are all going to get tested for Covid19. Patient is scheduled for Thursday, will let us know if he needs to cancel after getting results.     Ashley Kelly, PTA

## 2022-02-10 ENCOUNTER — HOSPITAL ENCOUNTER (OUTPATIENT)
Dept: PHYSICAL THERAPY | Age: 52
Setting detail: THERAPIES SERIES
Discharge: HOME | End: 2022-02-10
Payer: COMMERCIAL

## 2022-04-11 DIAGNOSIS — E78.49 OTHER HYPERLIPIDEMIA: ICD-10-CM

## 2022-04-11 RX ORDER — ATORVASTATIN CALCIUM 20 MG/1
TABLET, FILM COATED ORAL
Qty: 45 TABLET | Refills: 0 | OUTPATIENT
Start: 2022-04-11

## 2022-04-11 RX ORDER — ATORVASTATIN CALCIUM 20 MG/1
TABLET, FILM COATED ORAL
Qty: 45 TABLET | Refills: 0 | Status: SHIPPED
Start: 2022-04-11 | End: 2022-07-07

## 2022-05-22 DIAGNOSIS — K21.9 GASTROESOPHAGEAL REFLUX DISEASE, UNSPECIFIED WHETHER ESOPHAGITIS PRESENT: ICD-10-CM

## 2022-05-23 RX ORDER — PANTOPRAZOLE SODIUM 40 MG/1
40 TABLET, DELAYED RELEASE ORAL DAILY
Qty: 90 TABLET | Refills: 1 | Status: SHIPPED | OUTPATIENT
Start: 2022-05-23

## 2022-06-27 ENCOUNTER — TELEPHONE (OUTPATIENT)
Dept: FAMILY MEDICINE CLINIC | Age: 52
End: 2022-06-27

## 2022-06-27 NOTE — TELEPHONE ENCOUNTER
----- Message from Arleen Barlow sent at 6/27/2022  1:18 PM EDT -----  Subject: Referral Request    QUESTIONS   Reason for referral request? Pt needs lab orders before appt   Has the physician seen you for this condition before? No   Preferred Specialist (if applicable)? Do you already have an appointment scheduled? Yes  Select Scheduled Date? 2022-08-05  Select Scheduled Physician? Additional Information for Provider?   ---------------------------------------------------------------------------  --------------  CALL BACK INFO  What is the best way for the office to contact you? OK to leave message on   voicemail  Preferred Call Back Phone Number? [phone]  ---------------------------------------------------------------------------  --------------  SCRIPT ANSWERS  Relationship to Patient?  Self

## 2022-06-27 NOTE — TELEPHONE ENCOUNTER
Patient returned call. Informed of Dr. Brittney Diego response regarding lab orders. Patient agreeable.

## 2022-06-27 NOTE — TELEPHONE ENCOUNTER
Per Dr Aric Rosas: Has not seen in office for some time   Will order neccessary labs during the visit      Left  msg requesting return call.

## 2022-07-07 DIAGNOSIS — E78.49 OTHER HYPERLIPIDEMIA: ICD-10-CM

## 2022-07-07 RX ORDER — ATORVASTATIN CALCIUM 20 MG/1
TABLET, FILM COATED ORAL
Qty: 45 TABLET | Refills: 0 | Status: SHIPPED | OUTPATIENT
Start: 2022-07-07

## 2023-01-09 ENCOUNTER — TELEMEDICINE (OUTPATIENT)
Dept: FAMILY MEDICINE CLINIC | Age: 53
End: 2023-01-09
Payer: COMMERCIAL

## 2023-01-09 DIAGNOSIS — Z12.11 COLON CANCER SCREENING: ICD-10-CM

## 2023-01-09 DIAGNOSIS — K21.9 GASTROESOPHAGEAL REFLUX DISEASE, UNSPECIFIED WHETHER ESOPHAGITIS PRESENT: ICD-10-CM

## 2023-01-09 DIAGNOSIS — G47.33 OSA ON CPAP: ICD-10-CM

## 2023-01-09 DIAGNOSIS — Z99.89 OSA ON CPAP: ICD-10-CM

## 2023-01-09 DIAGNOSIS — Z12.5 SCREENING PSA (PROSTATE SPECIFIC ANTIGEN): ICD-10-CM

## 2023-01-09 DIAGNOSIS — F17.200 SMOKER: ICD-10-CM

## 2023-01-09 DIAGNOSIS — E78.49 OTHER HYPERLIPIDEMIA: Primary | ICD-10-CM

## 2023-01-09 DIAGNOSIS — Z11.59 NEED FOR HEPATITIS C SCREENING TEST: ICD-10-CM

## 2023-01-09 DIAGNOSIS — E66.01 MORBID OBESITY (HCC): ICD-10-CM

## 2023-01-09 PROCEDURE — 3017F COLORECTAL CA SCREEN DOC REV: CPT | Performed by: INTERNAL MEDICINE

## 2023-01-09 PROCEDURE — 99214 OFFICE O/P EST MOD 30 MIN: CPT | Performed by: INTERNAL MEDICINE

## 2023-01-09 PROCEDURE — G8427 DOCREV CUR MEDS BY ELIG CLIN: HCPCS | Performed by: INTERNAL MEDICINE

## 2023-01-09 RX ORDER — PANTOPRAZOLE SODIUM 40 MG/1
40 TABLET, DELAYED RELEASE ORAL DAILY
Qty: 90 TABLET | Refills: 0 | Status: SHIPPED | OUTPATIENT
Start: 2023-01-09

## 2023-01-09 RX ORDER — ATORVASTATIN CALCIUM 20 MG/1
10 TABLET, FILM COATED ORAL DAILY
Qty: 45 TABLET | Refills: 0 | Status: SHIPPED | OUTPATIENT
Start: 2023-01-09

## 2023-01-09 SDOH — ECONOMIC STABILITY: FOOD INSECURITY: WITHIN THE PAST 12 MONTHS, THE FOOD YOU BOUGHT JUST DIDN'T LAST AND YOU DIDN'T HAVE MONEY TO GET MORE.: NEVER TRUE

## 2023-01-09 SDOH — ECONOMIC STABILITY: FOOD INSECURITY: WITHIN THE PAST 12 MONTHS, YOU WORRIED THAT YOUR FOOD WOULD RUN OUT BEFORE YOU GOT MONEY TO BUY MORE.: NEVER TRUE

## 2023-01-09 ASSESSMENT — PATIENT HEALTH QUESTIONNAIRE - PHQ9
SUM OF ALL RESPONSES TO PHQ9 QUESTIONS 1 & 2: 0
1. LITTLE INTEREST OR PLEASURE IN DOING THINGS: 0
SUM OF ALL RESPONSES TO PHQ QUESTIONS 1-9: 0
SUM OF ALL RESPONSES TO PHQ QUESTIONS 1-9: 0
2. FEELING DOWN, DEPRESSED OR HOPELESS: 0
SUM OF ALL RESPONSES TO PHQ QUESTIONS 1-9: 0
SUM OF ALL RESPONSES TO PHQ QUESTIONS 1-9: 0

## 2023-01-09 ASSESSMENT — ENCOUNTER SYMPTOMS
SHORTNESS OF BREATH: 0
BLOOD IN STOOL: 0
ABDOMINAL PAIN: 0
EYE DISCHARGE: 0
NAUSEA: 0

## 2023-01-09 ASSESSMENT — SOCIAL DETERMINANTS OF HEALTH (SDOH): HOW HARD IS IT FOR YOU TO PAY FOR THE VERY BASICS LIKE FOOD, HOUSING, MEDICAL CARE, AND HEATING?: NOT HARD AT ALL

## 2023-01-09 NOTE — PROGRESS NOTES
TeleMedicine Video Visit    Tiago Appiah, was evaluated through a synchronous (real-time) audio-video encounter. The patient (or guardian if applicable) is aware that this is a billable service. , which includes applicable co-pays. This virtual visit was conducted with the patient's  (and/or legal guardian's) consent. The visit was conducted pursuant to the emergency declaration under the [facility] [address] and the [employer] Act. Patient identification was verified, and a caregiver was present when appropriate. The patient was located in a state where the provider was licensed to provide care. Patient identification was verified at the start of the visit, including the patient's telephone number and physical location. I discussed with the patient the nature of our telehealth visits, that:     Due to the nature of an audio- video modality, the only components of a physical exam that could be done are the elements supported by direct observation. I would evaluate the patient and recommend diagnostics and treatments based on my assessment. If it was felt that the patient should be evaluated in clinic or an emergency room setting, then they would be directed there. Our sessions are not being recorded and that personal health information is protected. Our team would provide follow up care in person if/when the patient needs it. Patient's location: home address in [address] [place] [address]   Physician  meaghan  [place] office  other people involved in call  - None   Chief Complaint   Patient presents with    Knee Pain     C/o left knee pain when walking up stairs x 1 week. Pt would like to get x-ray and lab work ordered. HPI:  Patient is a video visit    Feeling okay  Like to have labs prior to next OV        Allergy and Medications are reviewed and updated.   Past Medical History, Surgical History, and Family History has been reviewed and updated. Review of Systems:  Review of Systems   Constitutional:  Negative for chills and fever. HENT:  Negative for congestion and ear pain. Eyes:  Negative for discharge. Respiratory:  Negative for shortness of breath (No new SOB). Cardiovascular:  Negative for chest pain and leg swelling. Gastrointestinal:  Negative for abdominal pain, blood in stool and nausea. Endocrine: Negative for polydipsia. Genitourinary:  Negative for flank pain and hematuria. Musculoskeletal:  Negative for myalgias and neck pain. Skin:  Negative for rash. Neurological:  Negative for dizziness and seizures. Hematological:  Does not bruise/bleed easily. Psychiatric/Behavioral:  Negative for hallucinations and suicidal ideas. There were no vitals filed for this visit. Physical Exam  Vitals reviewed: Virtual visit. Denies Fever, . Constitutional:       Appearance: Normal appearance. He is not ill-appearing or diaphoretic. HENT:      Head: Normocephalic and atraumatic. Nose: Nose normal.   Eyes:      General:         Right eye: No discharge. Left eye: No discharge. Conjunctiva/sclera: Conjunctivae normal.   Pulmonary:      Effort: Pulmonary effort is normal. No respiratory distress. Skin:     General: Skin is dry. Coloration: Skin is not jaundiced. Neurological:      General: No focal deficit present. Mental Status: He is alert and oriented to person, place, and time. Mental status is at baseline. Psychiatric:         Mood and Affect: Mood normal.         Behavior: Behavior normal.         Thought Content:  Thought content normal.        Labs :    Lab Results   Component Value Date    WBC 10.5 11/24/2020    HGB 14.3 11/24/2020    HCT 43.9 11/24/2020     11/24/2020    CHOL 157 05/30/2017    TRIG 115 05/30/2017    HDL 32 08/19/2021    ALT 27 08/19/2021    AST 21 08/19/2021     08/19/2021    K 4.8 08/19/2021     (H) 08/19/2021    CREATININE 1.0 08/19/2021    BUN 11 08/19/2021    CO2 28 08/19/2021    TSH 1.610 11/24/2020    GLUF 112 (H) 08/19/2021    LABA1C 5.6 05/30/2017     Lab Results   Component Value Date/Time    COLORU Yellow 11/24/2020 10:36 AM    NITRU Negative 11/24/2020 10:36 AM    GLUCOSEU Negative 11/24/2020 10:36 AM    GLUCOSEU NEGATIVE 12/16/2011 09:14 AM    KETUA Negative 11/24/2020 10:36 AM    UROBILINOGEN 0.2 11/24/2020 10:36 AM    BILIRUBINUR Negative 11/24/2020 10:36 AM    BILIRUBINUR NEGATIVE 12/16/2011 09:14 AM           ASSESSMENT     Patient Active Problem List    Diagnosis Date Noted    S/P cervical spinal fusion 11/02/2020    Arthritis of lumbar spine 11/02/2020    Cervical herniated disc 02/14/2019    Herniation of cervical intervertebral disc     Myelopathy ([address]     Spinal stenosis of lumbar region without neurogenic claudication 01/22/2019    Cauda equina compression ([address] 01/20/2019    Hyperlipidemia 03/16/2018    GERD (gastroesophageal reflux disease) 03/16/2018    Morbid obesity ([address] 03/16/2018    JEFFREY on CPAP 03/16/2018        Diagnosis:   1. Other hyperlipidemia  -     atorvastatin (LIPITOR) 20 MG tablet; Take 0.5 tablets by mouth daily, Disp-45 tablet, R-0Normal  -     Comprehensive Metabolic Panel, Fasting; Future  -     Lipid, Fasting; Future  -     TSH; Future  2. Gastroesophageal reflux disease, unspecified whether esophagitis present  -     pantoprazole (PROTONIX) 40 MG tablet; Take 1 tablet by mouth daily, Disp-90 tablet, R-0Normal  -     CBC with Auto Differential; Future  3. JEFFREY on CPAP  4. Morbid obesity ([address]  5. Smoker  -     Urinalysis with Microscopic; Future  6. Screening PSA (prostate specific antigen)  -     PSA Screening; Future  7. Need for hepatitis C screening test  -     Hepatitis C Antibody; Future  8. Colon cancer screening  -     AFL - Denisa Berry MD,Gastroenterology, [facility]         PLAN:     available    Smoking cessation    Pt is stable on current medical treatment. Continue current treatment plan    Side effects/Adverse effects/Precautions are reviewed with patient. Low salt, Low Carb diet an low fat diet  Continue medications as advised and take them regularly  Regular exercises as advised    Discussed natural and expected course of this diagnosis and need to alert me if symptoms do not follow expected course, or if any worse. Smoking cessation if applicable, discussed with patient. Patient Instructions   The medication list included in this document is our record of what you are currently taking, including any changes that were made at today's visit. If you find any differences when compared to your medications at home, or have any questions that were not answered at your visit, please contact the office. Advise to have ( Fasting) lab test prior to next visit. Return for As Scheduled earlier. \"Not billed by time\",  This visit was completed virtually using [employer]. me

## 2023-02-09 ENCOUNTER — HOSPITAL ENCOUNTER (OUTPATIENT)
Age: 53
Discharge: HOME | End: 2023-02-09
Payer: COMMERCIAL

## 2023-02-09 DIAGNOSIS — E78.49 OTHER HYPERLIPIDEMIA: ICD-10-CM

## 2023-02-09 DIAGNOSIS — Z11.59 NEED FOR HEPATITIS C SCREENING TEST: ICD-10-CM

## 2023-02-09 DIAGNOSIS — F17.200 SMOKER: ICD-10-CM

## 2023-02-09 DIAGNOSIS — Z12.5 SCREENING PSA (PROSTATE SPECIFIC ANTIGEN): ICD-10-CM

## 2023-02-09 DIAGNOSIS — K21.9 GASTROESOPHAGEAL REFLUX DISEASE, UNSPECIFIED WHETHER ESOPHAGITIS PRESENT: ICD-10-CM

## 2023-02-09 LAB
ALBUMIN SERPL-MCNC: 4.2 G/DL (ref 3.5–5.2)
ALP BLD-CCNC: 64 U/L (ref 40–129)
ALT SERPL-CCNC: 27 U/L (ref 0–40)
ANION GAP SERPL CALCULATED.3IONS-SCNC: 11 MMOL/L (ref 7–16)
AST SERPL-CCNC: 20 U/L (ref 0–39)
BACTERIA: NORMAL /HPF
BASOPHILS ABSOLUTE: 0.06 E9/L (ref 0–0.2)
BASOPHILS RELATIVE PERCENT: 0.5 % (ref 0–2)
BILIRUB SERPL-MCNC: 0.6 MG/DL (ref 0–1.2)
BILIRUBIN URINE: NEGATIVE
BLOOD, URINE: NEGATIVE
BUN BLDV-MCNC: 12 MG/DL (ref 6–20)
CALCIUM SERPL-MCNC: 9.5 MG/DL (ref 8.6–10.2)
CHLORIDE BLD-SCNC: 103 MMOL/L (ref 98–107)
CHOLESTEROL, FASTING: 210 MG/DL (ref 0–199)
CLARITY: CLEAR
CO2: 21 MMOL/L (ref 22–29)
COLOR: YELLOW
CREAT SERPL-MCNC: 0.8 MG/DL (ref 0.7–1.2)
EOSINOPHILS ABSOLUTE: 0.22 E9/L (ref 0.05–0.5)
EOSINOPHILS RELATIVE PERCENT: 1.9 % (ref 0–6)
GFR SERPL CREATININE-BSD FRML MDRD: >60 ML/MIN/1.73
GLUCOSE FASTING: 84 MG/DL (ref 74–99)
GLUCOSE URINE: NEGATIVE MG/DL
HCT VFR BLD CALC: 42.2 % (ref 37–54)
HDLC SERPL-MCNC: 33 MG/DL
HEMOGLOBIN: 14.3 G/DL (ref 12.5–16.5)
IMMATURE GRANULOCYTES #: 0.04 E9/L
IMMATURE GRANULOCYTES %: 0.3 % (ref 0–5)
KETONES, URINE: NEGATIVE MG/DL
LDL CHOLESTEROL CALCULATED: 151 MG/DL (ref 0–99)
LEUKOCYTE ESTERASE, URINE: NEGATIVE
LYMPHOCYTES ABSOLUTE: 3.04 E9/L (ref 1.5–4)
LYMPHOCYTES RELATIVE PERCENT: 26.1 % (ref 20–42)
MCH RBC QN AUTO: 29.2 PG (ref 26–35)
MCHC RBC AUTO-ENTMCNC: 33.9 % (ref 32–34.5)
MCV RBC AUTO: 86.3 FL (ref 80–99.9)
MONOCYTES ABSOLUTE: 0.77 E9/L (ref 0.1–0.95)
MONOCYTES RELATIVE PERCENT: 6.6 % (ref 2–12)
NEUTROPHILS ABSOLUTE: 7.5 E9/L (ref 1.8–7.3)
NEUTROPHILS RELATIVE PERCENT: 64.6 % (ref 43–80)
NITRITE, URINE: NEGATIVE
PDW BLD-RTO: 12.3 FL (ref 11.5–15)
PH UA: 7 (ref 5–9)
PLATELET # BLD: 216 E9/L (ref 130–450)
PMV BLD AUTO: 10.6 FL (ref 7–12)
POTASSIUM SERPL-SCNC: 4 MMOL/L (ref 3.5–5)
PROSTATE SPECIFIC ANTIGEN: 0.62 NG/ML (ref 0–4)
PROTEIN UA: NEGATIVE MG/DL
RBC # BLD: 4.89 E12/L (ref 3.8–5.8)
RBC UA: NORMAL /HPF (ref 0–2)
SODIUM BLD-SCNC: 135 MMOL/L (ref 132–146)
SPECIFIC GRAVITY UA: <=1.005 (ref 1–1.03)
TOTAL PROTEIN: 7.5 G/DL (ref 6.4–8.3)
TRIGLYCERIDE, FASTING: 132 MG/DL (ref 0–149)
TSH SERPL DL<=0.05 MIU/L-ACNC: 1.68 UIU/ML (ref 0.27–4.2)
UROBILINOGEN, URINE: 1 E.U./DL
VLDLC SERPL CALC-MCNC: 26 MG/DL
WBC # BLD: 11.6 E9/L (ref 4.5–11.5)
WBC UA: NORMAL /HPF (ref 0–5)

## 2023-02-09 PROCEDURE — G0103 PSA SCREENING: HCPCS

## 2023-02-09 PROCEDURE — 84443 ASSAY THYROID STIM HORMONE: CPT

## 2023-02-09 PROCEDURE — 80053 COMPREHEN METABOLIC PANEL: CPT

## 2023-02-09 PROCEDURE — 86803 HEPATITIS C AB TEST: CPT

## 2023-02-09 PROCEDURE — 85025 COMPLETE CBC W/AUTO DIFF WBC: CPT

## 2023-02-09 PROCEDURE — 36415 COLL VENOUS BLD VENIPUNCTURE: CPT

## 2023-02-09 PROCEDURE — 81001 URINALYSIS AUTO W/SCOPE: CPT

## 2023-02-09 PROCEDURE — 80061 LIPID PANEL: CPT

## 2023-02-10 ENCOUNTER — OFFICE VISIT (OUTPATIENT)
Dept: FAMILY MEDICINE CLINIC | Age: 53
End: 2023-02-10
Payer: COMMERCIAL

## 2023-02-10 VITALS
HEIGHT: 68 IN | OXYGEN SATURATION: 96 % | WEIGHT: 315 LBS | HEART RATE: 72 BPM | BODY MASS INDEX: 47.74 KG/M2 | SYSTOLIC BLOOD PRESSURE: 136 MMHG | DIASTOLIC BLOOD PRESSURE: 84 MMHG | TEMPERATURE: 97.4 F

## 2023-02-10 DIAGNOSIS — K21.9 GASTROESOPHAGEAL REFLUX DISEASE, UNSPECIFIED WHETHER ESOPHAGITIS PRESENT: ICD-10-CM

## 2023-02-10 DIAGNOSIS — E78.49 OTHER HYPERLIPIDEMIA: Primary | ICD-10-CM

## 2023-02-10 DIAGNOSIS — Z99.89 OSA ON CPAP: ICD-10-CM

## 2023-02-10 DIAGNOSIS — E66.01 MORBID OBESITY (HCC): ICD-10-CM

## 2023-02-10 DIAGNOSIS — D72.829 LEUKOCYTOSIS, UNSPECIFIED TYPE: ICD-10-CM

## 2023-02-10 DIAGNOSIS — F17.200 SMOKER: ICD-10-CM

## 2023-02-10 DIAGNOSIS — G47.33 OSA ON CPAP: ICD-10-CM

## 2023-02-10 LAB — HEPATITIS C ANTIBODY INTERPRETATION: NORMAL

## 2023-02-10 PROCEDURE — 3017F COLORECTAL CA SCREEN DOC REV: CPT | Performed by: INTERNAL MEDICINE

## 2023-02-10 PROCEDURE — 99214 OFFICE O/P EST MOD 30 MIN: CPT | Performed by: INTERNAL MEDICINE

## 2023-02-10 PROCEDURE — G8427 DOCREV CUR MEDS BY ELIG CLIN: HCPCS | Performed by: INTERNAL MEDICINE

## 2023-02-10 PROCEDURE — G8484 FLU IMMUNIZE NO ADMIN: HCPCS | Performed by: INTERNAL MEDICINE

## 2023-02-10 PROCEDURE — G8417 CALC BMI ABV UP PARAM F/U: HCPCS | Performed by: INTERNAL MEDICINE

## 2023-02-10 PROCEDURE — 4004F PT TOBACCO SCREEN RCVD TLK: CPT | Performed by: INTERNAL MEDICINE

## 2023-02-10 SDOH — ECONOMIC STABILITY: FOOD INSECURITY: WITHIN THE PAST 12 MONTHS, THE FOOD YOU BOUGHT JUST DIDN'T LAST AND YOU DIDN'T HAVE MONEY TO GET MORE.: NEVER TRUE

## 2023-02-10 SDOH — ECONOMIC STABILITY: FOOD INSECURITY: WITHIN THE PAST 12 MONTHS, YOU WORRIED THAT YOUR FOOD WOULD RUN OUT BEFORE YOU GOT MONEY TO BUY MORE.: NEVER TRUE

## 2023-02-10 SDOH — ECONOMIC STABILITY: HOUSING INSECURITY
IN THE LAST 12 MONTHS, WAS THERE A TIME WHEN YOU DID NOT HAVE A STEADY PLACE TO SLEEP OR SLEPT IN A SHELTER (INCLUDING NOW)?: NO

## 2023-02-10 SDOH — ECONOMIC STABILITY: INCOME INSECURITY: HOW HARD IS IT FOR YOU TO PAY FOR THE VERY BASICS LIKE FOOD, HOUSING, MEDICAL CARE, AND HEATING?: NOT VERY HARD

## 2023-02-10 ASSESSMENT — ENCOUNTER SYMPTOMS
EYE DISCHARGE: 0
BLOOD IN STOOL: 0
SHORTNESS OF BREATH: 0
ABDOMINAL PAIN: 0
NAUSEA: 0

## 2023-02-10 NOTE — ASSESSMENT & PLAN NOTE
Uncontrolled, lifestyle modifications recommended     Pt has stopped his lipitor to \" see how it does\"    He says he will resume 10 mg of lipitor daily

## 2023-02-10 NOTE — PROGRESS NOTES
Chief Complaint   Patient presents with    Hyperlipidemia     Pt here for follow up on Hyperlipidemia, pt reports feeling ok     Discuss Labs     Review labs from 02/09/2023     Health Maintenance     Pneumonia vaccine, Flu vaccine- pt declined, Colonoscopy/FIT card- pt will call Dr. Boss, Low dose CT scan        HPI:  Patient is here for follow-up     Feeling ok    Mother in law passed away recently     Allergy and Medications are reviewed and updated.  Past Medical History, Surgical History, and Family History has been reviewed and updated.    Review of Systems:  Review of Systems   Constitutional:  Negative for chills and fever.   HENT:  Negative for congestion and ear pain.    Eyes:  Negative for discharge.   Respiratory:  Negative for shortness of breath (No new SOB).    Cardiovascular:  Negative for chest pain and leg swelling.   Gastrointestinal:  Negative for abdominal pain, blood in stool and nausea.   Endocrine: Negative for polydipsia.   Genitourinary:  Negative for flank pain and hematuria.   Musculoskeletal:  Negative for myalgias and neck pain.   Skin:  Negative for rash.   Neurological:  Negative for dizziness and seizures.   Hematological:  Does not bruise/bleed easily.   Psychiatric/Behavioral:  Negative for hallucinations and suicidal ideas.        Vitals:    02/10/23 1145   BP: 136/84   Position: Sitting   Pulse: 72   Temp: 97.4 °F (36.3 °C)   TempSrc: Temporal   SpO2: 96%   Weight: (!) 373 lb 6.4 oz (169.4 kg)   Height: 5' 8\" (1.727 m)       Physical Exam  Vitals reviewed.   Constitutional:       Appearance: He is well-developed.   HENT:      Head: Normocephalic and atraumatic.   Eyes:      Conjunctiva/sclera: Conjunctivae normal.      Pupils: Pupils are equal, round, and reactive to light.   Neck:      Vascular: No JVD.   Cardiovascular:      Rate and Rhythm: Normal rate and regular rhythm.   Pulmonary:      Effort: Pulmonary effort is normal.      Breath sounds: Normal breath sounds.  Abdominal:      General: Bowel sounds are normal.      Palpations: Abdomen is soft. Musculoskeletal:         General: Normal range of motion. Skin:     General: Skin is warm and dry. Neurological:      Mental Status: He is alert and oriented to person, place, and time.    Psychiatric:         Behavior: Behavior normal.        Labs :    Lab Results   Component Value Date    WBC 11.6 (H) 02/09/2023    HGB 14.3 02/09/2023    HCT 42.2 02/09/2023     02/09/2023    CHOL 157 05/30/2017    TRIG 115 05/30/2017    HDL 33 02/09/2023    ALT 27 02/09/2023    AST 20 02/09/2023     02/09/2023    K 4.0 02/09/2023     02/09/2023    CREATININE 0.8 02/09/2023    BUN 12 02/09/2023    CO2 21 (L) 02/09/2023    TSH 1.680 02/09/2023    PSA 0.62 02/09/2023    GLUF 84 02/09/2023    LABA1C 5.6 05/30/2017     Lab Results   Component Value Date/Time    COLORU Yellow 02/09/2023 03:04 PM    NITRU Negative 02/09/2023 03:04 PM    GLUCOSEU Negative 02/09/2023 03:04 PM    GLUCOSEU NEGATIVE 12/16/2011 09:14 AM    KETUA Negative 02/09/2023 03:04 PM    UROBILINOGEN 1.0 02/09/2023 03:04 PM    BILIRUBINUR Negative 02/09/2023 03:04 PM    BILIRUBINUR NEGATIVE 12/16/2011 09:14 AM     Lab Results   Component Value Date/Time    PSA 0.62 02/09/2023 02:53 PM             ASSESSMENT     Patient Active Problem List    Diagnosis Date Noted    Smoker 02/10/2023     Priority: Medium     Assessment & Plan Note:      Adv for smoking cessation      S/P cervical spinal fusion 11/02/2020    Arthritis of lumbar spine 11/02/2020    Cervical herniated disc 02/14/2019    Herniation of cervical intervertebral disc     Myelopathy ([address]     Spinal stenosis of lumbar region without neurogenic claudication 01/22/2019    Cauda equina compression ([address] 01/20/2019    Other hyperlipidemia 03/16/2018     Assessment & Plan Note:      Uncontrolled, lifestyle modifications recommended     Pt has stopped his lipitor to \" see how it does\"    He says he will resume 10 mg of lipitor daily       GERD (gastroesophageal reflux disease) 03/16/2018     Assessment & Plan Note:      Well-controlled, continue current medications     Pantoprazole 40 mg qd     Anti reflux measure       Morbid obesity ([address] 03/16/2018     Assessment & Plan Note:      Adv for weight loss, low caloric diet        JEFFREY on CPAP 03/16/2018     Assessment & Plan Note:      Well-controlled, continue current medications     On CPAP- waiting for new machine          Diagnosis:   1. Other hyperlipidemia  Assessment & Plan:   Uncontrolled, lifestyle modifications recommended     Pt has stopped his lipitor to \" see how it does\"    He says he will resume 10 mg of lipitor daily   2. Gastroesophageal reflux disease, unspecified whether esophagitis present  Assessment & Plan:   Well-controlled, continue current medications     Pantoprazole 40 mg qd     Anti reflux measure   3. JEFFREY on CPAP  Assessment & Plan:   Well-controlled, continue current medications     On CPAP- waiting for new machine  4. Morbid obesity ([address]  Assessment & Plan: Adv for weight loss, low caloric diet    5. Smoker  Assessment & Plan: Adv for smoking cessation  6. Leukocytosis, unspecified type  -     CBC with Auto Differential; Future       PLAN:     Labs reviewed    Adv to resume Lipitor 10 mg qd     Pt is stable on current medical treatment. Continue current treatment plan    Side effects/Adverse effects/Precautions are reviewed with patient. Low salt, Low Carb diet an low fat diet  Continue medications as advised and take them regularly  Regular exercises as advised    Discussed natural and expected course of this diagnosis and need to alert me if symptoms do not follow expected course, or if any worse. Smoking cessation if applicable, discussed with patient. Recheck CBC prior to next appt     There are no Patient Instructions on file for this visit. Return in about 3 months (around 5/10/2023).

## 2024-04-23 ASSESSMENT — PATIENT HEALTH QUESTIONNAIRE - PHQ9
SUM OF ALL RESPONSES TO PHQ9 QUESTIONS 1 & 2: 0
SUM OF ALL RESPONSES TO PHQ QUESTIONS 1-9: 0
1. LITTLE INTEREST OR PLEASURE IN DOING THINGS: NOT AT ALL
SUM OF ALL RESPONSES TO PHQ QUESTIONS 1-9: 0
SUM OF ALL RESPONSES TO PHQ9 QUESTIONS 1 & 2: 0
2. FEELING DOWN, DEPRESSED OR HOPELESS: NOT AT ALL
SUM OF ALL RESPONSES TO PHQ QUESTIONS 1-9: 0
SUM OF ALL RESPONSES TO PHQ QUESTIONS 1-9: 0
1. LITTLE INTEREST OR PLEASURE IN DOING THINGS: NOT AT ALL
2. FEELING DOWN, DEPRESSED OR HOPELESS: NOT AT ALL

## 2024-04-25 ENCOUNTER — OFFICE VISIT (OUTPATIENT)
Dept: FAMILY MEDICINE CLINIC | Age: 54
End: 2024-04-25
Payer: COMMERCIAL

## 2024-04-25 VITALS
BODY MASS INDEX: 47.74 KG/M2 | HEIGHT: 68 IN | DIASTOLIC BLOOD PRESSURE: 80 MMHG | HEART RATE: 69 BPM | WEIGHT: 315 LBS | TEMPERATURE: 97.8 F | OXYGEN SATURATION: 97 % | SYSTOLIC BLOOD PRESSURE: 126 MMHG

## 2024-04-25 DIAGNOSIS — E78.49 OTHER HYPERLIPIDEMIA: ICD-10-CM

## 2024-04-25 DIAGNOSIS — K21.9 GASTROESOPHAGEAL REFLUX DISEASE, UNSPECIFIED WHETHER ESOPHAGITIS PRESENT: ICD-10-CM

## 2024-04-25 DIAGNOSIS — Z87.891 PERSONAL HISTORY OF TOBACCO USE: ICD-10-CM

## 2024-04-25 DIAGNOSIS — Z12.5 SCREENING PSA (PROSTATE SPECIFIC ANTIGEN): ICD-10-CM

## 2024-04-25 DIAGNOSIS — Z12.11 COLON CANCER SCREENING: ICD-10-CM

## 2024-04-25 DIAGNOSIS — Z71.89 ACP (ADVANCE CARE PLANNING): ICD-10-CM

## 2024-04-25 DIAGNOSIS — F17.200 SMOKER: ICD-10-CM

## 2024-04-25 DIAGNOSIS — E66.01 CLASS 3 SEVERE OBESITY WITH SERIOUS COMORBIDITY AND BODY MASS INDEX (BMI) OF 50.0 TO 59.9 IN ADULT, UNSPECIFIED OBESITY TYPE (HCC): ICD-10-CM

## 2024-04-25 DIAGNOSIS — Z00.00 ENCOUNTER FOR WELL ADULT EXAM WITHOUT ABNORMAL FINDINGS: Primary | ICD-10-CM

## 2024-04-25 DIAGNOSIS — G47.33 OSA ON CPAP: ICD-10-CM

## 2024-04-25 PROCEDURE — 99396 PREV VISIT EST AGE 40-64: CPT | Performed by: INTERNAL MEDICINE

## 2024-04-25 RX ORDER — PANTOPRAZOLE SODIUM 40 MG/1
40 TABLET, DELAYED RELEASE ORAL DAILY
Qty: 90 TABLET | Refills: 0 | Status: SHIPPED | OUTPATIENT
Start: 2024-04-25

## 2024-04-25 RX ORDER — ATORVASTATIN CALCIUM 20 MG/1
10 TABLET, FILM COATED ORAL DAILY
Qty: 45 TABLET | Refills: 0 | Status: SHIPPED | OUTPATIENT
Start: 2024-04-25

## 2024-04-25 SDOH — ECONOMIC STABILITY: FOOD INSECURITY: WITHIN THE PAST 12 MONTHS, YOU WORRIED THAT YOUR FOOD WOULD RUN OUT BEFORE YOU GOT MONEY TO BUY MORE.: NEVER TRUE

## 2024-04-25 SDOH — ECONOMIC STABILITY: INCOME INSECURITY: HOW HARD IS IT FOR YOU TO PAY FOR THE VERY BASICS LIKE FOOD, HOUSING, MEDICAL CARE, AND HEATING?: NOT HARD AT ALL

## 2024-04-25 SDOH — ECONOMIC STABILITY: FOOD INSECURITY: WITHIN THE PAST 12 MONTHS, THE FOOD YOU BOUGHT JUST DIDN'T LAST AND YOU DIDN'T HAVE MONEY TO GET MORE.: NEVER TRUE

## 2024-04-25 ASSESSMENT — ENCOUNTER SYMPTOMS
SHORTNESS OF BREATH: 0
EYE DISCHARGE: 0
BLOOD IN STOOL: 0
EYE PAIN: 0
NAUSEA: 0
SORE THROAT: 0
ABDOMINAL PAIN: 0
SINUS PAIN: 0

## 2024-04-25 NOTE — PATIENT INSTRUCTIONS
Advance Care Planning     Advance Care Planning opens a door to talk about and write down your wishes before a sudden accident or illness.  Make your goals, values, and preferences known.     This puts you in the ’s seat and helps others know what matters most to you so they won’t have to guess.      Where can you learn more?    Go to [email or web address]-[email or web address]   to learn how to:    Name someone you trust to make healthcare decisions for you, only if you can’t. (Healthcare Power of )    Document your wishes for care if you were seriously ill and not expected to recover or are approaching end of life. (Advance Directive or Living Will)    The same page can be found using the QR code below.                Starting a Weight Loss Plan: Care Instructions  Overview     It can be a challenge to lose weight. But your doctor can help you make a weight-loss plan that meets your needs.  You don't have to make a lot of big changes at once. A better idea might be to focus on small changes and stick with them. When those changes become habit, you can add a few more changes.  Some people find it helpful to take an exercise or nutrition class. If you have questions, ask your doctor about seeing a registered dietitian or an exercise specialist. You might also think about joining a weight-loss support group.  If you're not ready to make changes right now, try to pick a date in the future. Then make an appointment with your doctor to talk about when and how you'll get started with a plan.  Follow-up care is a key part of your treatment and safety. Be sure to make and go to all appointments, and call your doctor if you are having problems. It's also a good idea to know your test results and keep a list of the medicines you take.  How can you care for yourself at home?  Set realistic goals. Many people expect to lose much more weight than is likely. A weight loss of 5% to 10% of your body

## 2024-05-20 ENCOUNTER — TELEPHONE (OUTPATIENT)
Dept: BARIATRICS/WEIGHT MGMT | Age: 54
End: 2024-05-20

## 2024-06-17 ENCOUNTER — TELEPHONE (OUTPATIENT)
Dept: BARIATRICS/WEIGHT MGMT | Age: 54
End: 2024-06-17

## 2024-06-17 NOTE — TELEPHONE ENCOUNTER
I called patient for second time left message on voicemail to schedule initial medical weight loss appt.

## 2024-07-10 DIAGNOSIS — F17.200 SMOKER: ICD-10-CM

## 2024-07-10 DIAGNOSIS — Z00.00 ENCOUNTER FOR WELL ADULT EXAM WITHOUT ABNORMAL FINDINGS: ICD-10-CM

## 2024-07-10 DIAGNOSIS — Z12.5 SCREENING PSA (PROSTATE SPECIFIC ANTIGEN): ICD-10-CM

## 2024-07-10 DIAGNOSIS — E78.49 OTHER HYPERLIPIDEMIA: ICD-10-CM

## 2024-07-10 LAB
ALBUMIN: 4 G/DL (ref 3.5–5.2)
ALP BLD-CCNC: 57 U/L (ref 40–129)
ALT SERPL-CCNC: 26 U/L (ref 0–40)
ANION GAP SERPL CALCULATED.3IONS-SCNC: 13 MMOL/L (ref 7–16)
AST SERPL-CCNC: 23 U/L (ref 0–39)
BASOPHILS ABSOLUTE: 0.06 K/UL (ref 0–0.2)
BASOPHILS RELATIVE PERCENT: 1 % (ref 0–2)
BILIRUB SERPL-MCNC: 0.3 MG/DL (ref 0–1.2)
BILIRUBIN, URINE: NEGATIVE
BUN BLDV-MCNC: 13 MG/DL (ref 6–20)
CALCIUM SERPL-MCNC: 9.7 MG/DL (ref 8.6–10.2)
CHLORIDE BLD-SCNC: 104 MMOL/L (ref 98–107)
CHOLESTEROL, FASTING: 197 MG/DL
CO2: 23 MMOL/L (ref 22–29)
COLOR, UA: YELLOW
CREAT SERPL-MCNC: 0.8 MG/DL (ref 0.7–1.2)
EOSINOPHILS ABSOLUTE: 0.31 K/UL (ref 0.05–0.5)
EOSINOPHILS RELATIVE PERCENT: 4 % (ref 0–6)
GFR, ESTIMATED: >90 ML/MIN/1.73M2
GLUCOSE FASTING: 100 MG/DL (ref 74–99)
GLUCOSE URINE: NEGATIVE MG/DL
HCT VFR BLD CALC: 42.6 % (ref 37–54)
HDLC SERPL-MCNC: 32 MG/DL
HEMOGLOBIN: 13.9 G/DL (ref 12.5–16.5)
IMMATURE GRANULOCYTES %: 1 % (ref 0–5)
IMMATURE GRANULOCYTES ABSOLUTE: 0.05 K/UL (ref 0–0.58)
KETONES, URINE: NEGATIVE MG/DL
LDL CHOLESTEROL: 142 MG/DL
LEUKOCYTE ESTERASE, URINE: NEGATIVE
LYMPHOCYTES ABSOLUTE: 2.46 K/UL (ref 1.5–4)
LYMPHOCYTES RELATIVE PERCENT: 28 % (ref 20–42)
MCH RBC QN AUTO: 30 PG (ref 26–35)
MCHC RBC AUTO-ENTMCNC: 32.6 G/DL (ref 32–34.5)
MCV RBC AUTO: 92 FL (ref 80–99.9)
MONOCYTES ABSOLUTE: 0.67 K/UL (ref 0.1–0.95)
MONOCYTES RELATIVE PERCENT: 8 % (ref 2–12)
NEUTROPHILS ABSOLUTE: 5.18 K/UL (ref 1.8–7.3)
NEUTROPHILS RELATIVE PERCENT: 59 % (ref 43–80)
NITRITE, URINE: NEGATIVE
PDW BLD-RTO: 12.5 % (ref 11.5–15)
PH, URINE: 6.5 (ref 5–9)
PLATELET # BLD: 212 K/UL (ref 130–450)
PMV BLD AUTO: 11.7 FL (ref 7–12)
POTASSIUM SERPL-SCNC: 5.1 MMOL/L (ref 3.5–5)
PROSTATE SPECIFIC ANTIGEN: 0.87 NG/ML (ref 0–4)
PROTEIN UA: NEGATIVE MG/DL
RBC # BLD: 4.63 M/UL (ref 3.8–5.8)
RBC UA: ABNORMAL /HPF
SODIUM BLD-SCNC: 140 MMOL/L (ref 132–146)
SPECIFIC GRAVITY UA: <1.005 (ref 1–1.03)
TOTAL PROTEIN: 7.2 G/DL (ref 6.4–8.3)
TRIGLYCERIDE, FASTING: 116 MG/DL
TSH SERPL DL<=0.05 MIU/L-ACNC: 1.98 UIU/ML (ref 0.27–4.2)
TURBIDITY: CLEAR
URINE HGB: NEGATIVE
UROBILINOGEN, URINE: 0.2 EU/DL (ref 0–1)
VLDLC SERPL CALC-MCNC: 23 MG/DL
WBC # BLD: 8.7 K/UL (ref 4.5–11.5)
WBC UA: ABNORMAL /HPF

## 2024-07-16 DIAGNOSIS — E78.49 OTHER HYPERLIPIDEMIA: ICD-10-CM

## 2024-07-16 RX ORDER — ATORVASTATIN CALCIUM 20 MG/1
10 TABLET, FILM COATED ORAL DAILY
Qty: 45 TABLET | Refills: 0 | OUTPATIENT
Start: 2024-07-16

## 2024-10-01 NOTE — PROGRESS NOTES
[facility]                                                                                                                    PRE OP INSTRUCTIONS FOR  Ko Gutierrez        Date: 10/1/2024    Date of surgery: 10-2-24   Arrival Time: Hospital will call you between 5pm and 7pm with your final arrival time for surgery. Go to     front of hospital and check in at information desk.    Nothing by mouth (NPO) as instructed. May have clear liquids up to 2 hours prior to surgery. Nothing solid after midnight. Examples: water, apple juice, black coffee, plain tea    Take the following medications with a small sip of water on the morning of Surgery: none     Diabetics may take half the evening dose of insulin but none after midnight.  If you feel symptomatic or have low blood sugar morning of surgery drink 1-2 ounces of apple juice only. If you take a weekly insulin injection _______________, stop 7 days prior to surgery. If you take _______________, stop 3-4 days prior to surgery.    Aspirin, Ibuprofen, Advil, Naproxen, other Anti-inflammatory products should be stopped before surgery as directed by your surgeon, cardiologist, or primary care Doctor. Herbal supplements and Vitamin E should be stopped five days prior.  May take Tylenol unless instructed otherwise by your surgeon.    Check with your Doctor regarding stopping Plavix, Coumadin, Lovenox, Eliquis, Effient, or other blood thinners such as, pradaxa, lixiana, xaralto and savaysa.    Do not smoke, vape, or use illicit drugs and do not drink any alcoholic beverages 24 hours prior to surgery.    You may brush your teeth the morning of surgery.      You MUST make arrangements for a responsible adult, 18 and over, to take you home after your surgery. You will not be allowed to leave alone or drive yourself home.  You will need someone stay with you the first 24 hrs. Your surgery will be cancelled if you do not have a ride home or someone to stay

## 2024-10-02 ENCOUNTER — ANESTHESIA EVENT (OUTPATIENT)
Dept: ENDOSCOPY | Age: 54
End: 2024-10-02
Payer: COMMERCIAL

## 2024-10-02 ENCOUNTER — HOSPITAL ENCOUNTER (OUTPATIENT)
Age: 54
Setting detail: OUTPATIENT SURGERY
Discharge: HOME | End: 2024-10-02
Attending: INTERNAL MEDICINE | Admitting: INTERNAL MEDICINE
Payer: COMMERCIAL

## 2024-10-02 ENCOUNTER — ANESTHESIA (OUTPATIENT)
Dept: ENDOSCOPY | Age: 54
End: 2024-10-02
Payer: COMMERCIAL

## 2024-10-02 VITALS
TEMPERATURE: 97 F | SYSTOLIC BLOOD PRESSURE: 113 MMHG | WEIGHT: 315 LBS | DIASTOLIC BLOOD PRESSURE: 67 MMHG | HEIGHT: 68 IN | OXYGEN SATURATION: 97 % | RESPIRATION RATE: 16 BRPM | HEART RATE: 71 BPM | BODY MASS INDEX: 47.74 KG/M2

## 2024-10-02 DIAGNOSIS — R10.9 STOMACH ACHE: ICD-10-CM

## 2024-10-02 DIAGNOSIS — Z12.11 SPECIAL SCREENING FOR MALIGNANT NEOPLASMS, COLON: ICD-10-CM

## 2024-10-02 PROCEDURE — 7100000010 HC PHASE II RECOVERY - FIRST 15 MIN: Performed by: INTERNAL MEDICINE

## 2024-10-02 PROCEDURE — 2709999900 HC NON-CHARGEABLE SUPPLY: Performed by: INTERNAL MEDICINE

## 2024-10-02 PROCEDURE — 3609012400 HC EGD TRANSORAL BIOPSY SINGLE/MULTIPLE: Performed by: INTERNAL MEDICINE

## 2024-10-02 PROCEDURE — 6360000002 HC RX W HCPCS: Performed by: NURSE ANESTHETIST, CERTIFIED REGISTERED

## 2024-10-02 PROCEDURE — 88342 IMHCHEM/IMCYTCHM 1ST ANTB: CPT

## 2024-10-02 PROCEDURE — 3609010600 HC COLONOSCOPY POLYPECTOMY SNARE/COLD BIOPSY: Performed by: INTERNAL MEDICINE

## 2024-10-02 PROCEDURE — 2500000003 HC RX 250 WO HCPCS: Performed by: NURSE ANESTHETIST, CERTIFIED REGISTERED

## 2024-10-02 PROCEDURE — 88305 TISSUE EXAM BY PATHOLOGIST: CPT

## 2024-10-02 PROCEDURE — 7100000011 HC PHASE II RECOVERY - ADDTL 15 MIN: Performed by: INTERNAL MEDICINE

## 2024-10-02 PROCEDURE — 2580000003 HC RX 258: Performed by: NURSE ANESTHETIST, CERTIFIED REGISTERED

## 2024-10-02 PROCEDURE — 2580000003 HC RX 258: Performed by: ANESTHESIOLOGY

## 2024-10-02 PROCEDURE — 3700000000 HC ANESTHESIA ATTENDED CARE: Performed by: INTERNAL MEDICINE

## 2024-10-02 PROCEDURE — 3700000001 HC ADD 15 MINUTES (ANESTHESIA): Performed by: INTERNAL MEDICINE

## 2024-10-02 RX ORDER — LIDOCAINE HYDROCHLORIDE 20 MG/ML
INJECTION, SOLUTION INFILTRATION; PERINEURAL
Status: DISCONTINUED | OUTPATIENT
Start: 2024-10-02 | End: 2024-10-02 | Stop reason: SDUPTHER

## 2024-10-02 RX ORDER — KETAMINE HYDROCHLORIDE 50 MG/ML
INJECTION, SOLUTION INTRAMUSCULAR; INTRAVENOUS
Status: DISCONTINUED | OUTPATIENT
Start: 2024-10-02 | End: 2024-10-02 | Stop reason: SDUPTHER

## 2024-10-02 RX ORDER — SODIUM CHLORIDE, SODIUM LACTATE, POTASSIUM CHLORIDE, CALCIUM CHLORIDE 600; 310; 30; 20 MG/100ML; MG/100ML; MG/100ML; MG/100ML
INJECTION, SOLUTION INTRAVENOUS CONTINUOUS
Status: DISCONTINUED | OUTPATIENT
Start: 2024-10-02 | End: 2024-10-02 | Stop reason: HOSPADM

## 2024-10-02 RX ORDER — GLYCOPYRROLATE 0.2 MG/ML
INJECTION INTRAMUSCULAR; INTRAVENOUS
Status: DISCONTINUED | OUTPATIENT
Start: 2024-10-02 | End: 2024-10-02 | Stop reason: SDUPTHER

## 2024-10-02 RX ORDER — PROPOFOL 10 MG/ML
INJECTION, EMULSION INTRAVENOUS
Status: DISCONTINUED | OUTPATIENT
Start: 2024-10-02 | End: 2024-10-02 | Stop reason: SDUPTHER

## 2024-10-02 RX ORDER — SODIUM CHLORIDE, SODIUM LACTATE, POTASSIUM CHLORIDE, CALCIUM CHLORIDE 600; 310; 30; 20 MG/100ML; MG/100ML; MG/100ML; MG/100ML
INJECTION, SOLUTION INTRAVENOUS
Status: DISCONTINUED | OUTPATIENT
Start: 2024-10-02 | End: 2024-10-02 | Stop reason: SDUPTHER

## 2024-10-02 RX ADMIN — SODIUM CHLORIDE, POTASSIUM CHLORIDE, SODIUM LACTATE AND CALCIUM CHLORIDE: 600; 310; 30; 20 INJECTION, SOLUTION INTRAVENOUS at 08:22

## 2024-10-02 RX ADMIN — SODIUM CHLORIDE, POTASSIUM CHLORIDE, SODIUM LACTATE AND CALCIUM CHLORIDE: 600; 310; 30; 20 INJECTION, SOLUTION INTRAVENOUS at 09:02

## 2024-10-02 RX ADMIN — GLYCOPYRROLATE 0.3 MG: 0.2 INJECTION, SOLUTION INTRAMUSCULAR; INTRAVENOUS at 09:04

## 2024-10-02 RX ADMIN — PROPOFOL 120 MG: 10 INJECTION, EMULSION INTRAVENOUS at 09:08

## 2024-10-02 RX ADMIN — KETAMINE HYDROCHLORIDE 50 MG: 50 INJECTION INTRAMUSCULAR; INTRAVENOUS at 09:08

## 2024-10-02 RX ADMIN — PROPOFOL 120 MCG/KG/MIN: 10 INJECTION, EMULSION INTRAVENOUS at 09:09

## 2024-10-02 RX ADMIN — LIDOCAINE HYDROCHLORIDE 200 MG: 20 INJECTION, SOLUTION INFILTRATION; PERINEURAL at 09:08

## 2024-10-02 ASSESSMENT — LIFESTYLE VARIABLES: SMOKING_STATUS: 1

## 2024-10-02 ASSESSMENT — PAIN SCALES - GENERAL: PAINLEVEL_OUTOF10: 0

## 2024-10-02 ASSESSMENT — PAIN - FUNCTIONAL ASSESSMENT: PAIN_FUNCTIONAL_ASSESSMENT: 0-10

## 2024-10-02 NOTE — OP NOTE
Operative Note      Patient: Ko Gutierrez II  YOB: 1970  MRN: [id number]    Date of Procedure: 10/2/2024    Pre-Op Diagnosis Codes:      * Special screening for malignant neoplasms, colon [Z12.11]     * Stomach ache [R10.9]    Post-Op Diagnosis:  Normal EGD ; colon polyp       Procedure(s):  ESOPHAGOGASTRODUODENOSCOPY BIOPSY  COLONOSCOPY POLYPECTOMY COLD SNARE    Surgeon(s):  Salinas Walden MD    Assistant:   Surgical Assistant: Sharron Tadeo RN    Anesthesia: Monitor Anesthesia Care    Estimated Blood Loss (mL): none    Complications: None    Specimens:   ID Type Source Tests Collected by Time Destination   A : gastric bx r/o h.pylori Gastric Gastric SURGICAL PATHOLOGY Salinas Walden MD 10/2/2024 0916    B : duodenal bx r/o celiac disease Tissue Duodenum SURGICAL PATHOLOGY Salinas Walden MD 10/2/2024 0917    C : esophageal bx r/o barretts and patho Tissue Tissue SURGICAL PATHOLOGY Salinas Walden MD 10/2/2024 0921    D : cecal polypectomy Tissue Colon SURGICAL PATHOLOGY Salinas Walden MD 10/2/2024 0925        Implants:  * No implants in log *      Drains: * No LDAs found *    Detailed Description of Procedure:  EGD  Indication GERD    Sedation  MAC    Endoscope was advanced easily through mouth to second portion of duodenum      Oropharynx views are limited but grossly normal.    Esophagus:   Mucosa is normal.  GEJ at 42 cm.  Biopsied    Stomach:   Antrum is normal; biopsied for H. Pylori    Gastric body is normal.    Retroflexed views show normal fundus and cardia.    Duodenum: Bulb is normal.    Second portion of duodenum is normal. Biopsied.    IMPRESSION AND PLAN: Normal upper endoscopy.  Follow up as outpatient in office, call [phone] to schedule for appointment.    Colonoscopy note    Indication screening colonoscopy      Sedation  Demerol 100 mg IV  Versed 5 mg IV    Endoscope was advanced through anus to cecum identified by presence of IC valve and appendiceal orrifice      Preparation is

## 2024-10-02 NOTE — ANESTHESIA POSTPROCEDURE EVALUATION
Department of Anesthesiology  Postprocedure Note    Patient: Ko Gutierrez II  MRN: [id number]  YOB: 1970  Date of evaluation: 10/2/2024    Procedure Summary       Date: 10/02/24 Room / Location: [place] [address] / [facility]    Anesthesia Start: 0902 Anesthesia Stop: 0923    Procedures:       ESOPHAGOGASTRODUODENOSCOPY BIOPSY      COLONOSCOPY POLYPECTOMY COLD SNARE Diagnosis:       Special screening for malignant neoplasms, colon      Stomach ache      (Special screening for malignant neoplasms, colon [Z12.11])      (Stomach ache [R10.9])    Surgeons: Salinas Walden MD Responsible Provider: Lawson Fields MD    Anesthesia Type: MAC ASA Status: 3            Anesthesia Type: No value filed.    Faye Phase I: Faye Score: 10    Faye Phase II:      Anesthesia Post Evaluation    Patient location during evaluation: PACU  Patient participation: complete - patient participated  Pain score: 2  Airway patency: patent  Nausea & Vomiting: no nausea  Cardiovascular status: blood pressure returned to baseline  Respiratory status: acceptable  Hydration status: euvolemic  Pain management: adequate    No notable events documented.

## 2024-10-02 NOTE — H&P
Department of Internal Medicine  Gastroenterology  Attending History and Physical      CHIEF COMPLAINT:  GERD, screening colonoscopy    Reason for Admission:  outpatient EGD and colonoscopy    History Obtained From:  patient, electronic medical record    HISTORY OF PRESENT ILLNESS:      The patient is a 54 y.o. male with significant past medical history of sleep apnea who presents for outpatient EGD and colonoscopy    Past Medical History:        Diagnosis Date    Cauda equina syndrome (HCC)     with weakness in extremities    Cervical radiculopathy     Chronic back pain 2019    stable    GERD (gastroesophageal reflux disease)     Hyperlipidemia     Obesity     Onychomycosis     Sleep apnea 2018    using machine- CPAP     Past Surgical History:        Procedure Laterality Date    CERVICAL FUSION N/A 02/14/2019    C5-C6  ANTERIOR CERVICAL DISCECTOMY AND FUSION -- NEEDS PLATES, SCREWS, REGULAR BED WITH HORSE SHOE - GLOBUS [place] [facility] [place]    [place]ON[place]COPY           Medications Prior to Admission:    Medications Prior to Admission: atorvastatin (LIPITOR) 20 MG tablet, Take 0.5 tablets by mouth daily  pantoprazole (PROTONIX) 40 MG tablet, Take 1 tablet by mouth daily  Multiple Vitamins-Minerals (MENS MULTIVITAMIN PLUS PO),     Allergies:  Patient has no known allergies.    Social History:   Social History     Socioeconomic History    Marital status:      Spouse name: Not on file    Number of children: Not on file    Years of education: Not on file    Highest education level: Not on file   Occupational History    Not on file   Tobacco Use    Smoking status: Every Day     Current packs/day: 1.00     Average packs/day: 1 pack/day for 30.0 years (30.0 ttl pk-yrs)     Types: Cigarettes    Smokeless tobacco: Never   Vaping Use    Vaping status: Former   Substance and Sexual Activity    Alcohol use: Yes     Comment: rare    Drug use: Never    Sexual activity: Yes     Partners: Female   Other Topics

## 2024-10-02 NOTE — ANESTHESIA PRE PROCEDURE
AM    LABGLOM >90 07/10/2024 09:04 AM    LABGLOM >60 02/09/2023 02:53 PM    GLUCOSE 101 01/24/2019 06:45 AM    GLUCOSE 89 04/25/2012 09:05 AM    CALCIUM 9.7 07/10/2024 09:04 AM    BILITOT 0.3 07/10/2024 09:04 AM    ALKPHOS 57 07/10/2024 09:04 AM    AST 23 07/10/2024 09:04 AM    ALT 26 07/10/2024 09:04 AM       POC Tests: No results for input(s): \"POCGLU\", \"POCNA\", \"POCK\", \"POCCL\", \"POCBUN\", \"POCHEMO\", \"POCHCT\" in the last 72 hours.    Coags: No results found for: \"PROTIME\", \"INR\", \"APTT\"    HCG (If Applicable): No results found for: \"PREGTESTUR\", \"PREGSERUM\", \"HCG\", \"HCGQUANT\"     ABGs: No results found for: \"PHART\", \"PO2ART\", \"YEV7VPC\", \"AVI6MTH\", \"BEART\", \"C1GMOFSG\"     Type & Screen (If Applicable):  Lab Results   Component Value Date    ABORH A POS 02/14/2019    LABANTI NEG 02/14/2019       Drug/Infectious Status (If Applicable):  No results found for: \"HIV\", \"HEPCAB\"    COVID-19 Screening (If Applicable): No results found for: \"COVID19\"        Anesthesia Evaluation    Airway: Mallampati: III  TM distance: >3 FB   Neck ROM: full  Mouth opening: > = 3 FB   Dental: normal exam         Pulmonary:   (+)     sleep apnea:   decreased breath sounds    current smoker          Patient smoked on day of surgery.                 Cardiovascular:    (+) hyperlipidemia        Rhythm: regular  Rate: normal                    Neuro/Psych:   (+) neuromuscular disease:            GI/Hepatic/Renal:   (+) GERD:, morbid obesity          Endo/Other:                     Abdominal:   (+) obese          Vascular:          Other Findings:             Anesthesia Plan      MAC     ASA 3             Anesthetic plan and risks discussed with patient.        Attending anesthesiologist reviewed and agrees with Preprocedure content                Elie J Andreas, APRN - CRNA   10/2/2024

## 2024-10-04 LAB — SURGICAL PATHOLOGY REPORT: NORMAL

## 2024-10-24 ENCOUNTER — OFFICE VISIT (OUTPATIENT)
Dept: FAMILY MEDICINE CLINIC | Age: 54
End: 2024-10-24
Payer: COMMERCIAL

## 2024-10-24 VITALS
DIASTOLIC BLOOD PRESSURE: 80 MMHG | TEMPERATURE: 98.2 F | HEART RATE: 70 BPM | SYSTOLIC BLOOD PRESSURE: 122 MMHG | BODY MASS INDEX: 47.74 KG/M2 | OXYGEN SATURATION: 98 % | WEIGHT: 315 LBS | HEIGHT: 68 IN

## 2024-10-24 DIAGNOSIS — G47.33 OSA ON CPAP: ICD-10-CM

## 2024-10-24 DIAGNOSIS — E78.49 OTHER HYPERLIPIDEMIA: Primary | ICD-10-CM

## 2024-10-24 DIAGNOSIS — Z98.1 S/P CERVICAL SPINAL FUSION: ICD-10-CM

## 2024-10-24 DIAGNOSIS — K21.9 GASTROESOPHAGEAL REFLUX DISEASE, UNSPECIFIED WHETHER ESOPHAGITIS PRESENT: ICD-10-CM

## 2024-10-24 DIAGNOSIS — E66.01 CLASS 3 SEVERE OBESITY WITH SERIOUS COMORBIDITY AND BODY MASS INDEX (BMI) OF 50.0 TO 59.9 IN ADULT, UNSPECIFIED OBESITY TYPE: ICD-10-CM

## 2024-10-24 DIAGNOSIS — E66.813 CLASS 3 SEVERE OBESITY WITH SERIOUS COMORBIDITY AND BODY MASS INDEX (BMI) OF 50.0 TO 59.9 IN ADULT, UNSPECIFIED OBESITY TYPE: ICD-10-CM

## 2024-10-24 DIAGNOSIS — F17.200 SMOKER: ICD-10-CM

## 2024-10-24 PROCEDURE — 3017F COLORECTAL CA SCREEN DOC REV: CPT | Performed by: INTERNAL MEDICINE

## 2024-10-24 PROCEDURE — G8417 CALC BMI ABV UP PARAM F/U: HCPCS | Performed by: INTERNAL MEDICINE

## 2024-10-24 PROCEDURE — 99214 OFFICE O/P EST MOD 30 MIN: CPT | Performed by: INTERNAL MEDICINE

## 2024-10-24 PROCEDURE — G8427 DOCREV CUR MEDS BY ELIG CLIN: HCPCS | Performed by: INTERNAL MEDICINE

## 2024-10-24 PROCEDURE — G8484 FLU IMMUNIZE NO ADMIN: HCPCS | Performed by: INTERNAL MEDICINE

## 2024-10-24 PROCEDURE — 4004F PT TOBACCO SCREEN RCVD TLK: CPT | Performed by: INTERNAL MEDICINE

## 2024-10-24 RX ORDER — ATORVASTATIN CALCIUM 20 MG/1
10 TABLET, FILM COATED ORAL DAILY
Qty: 45 TABLET | Refills: 1 | Status: SHIPPED | OUTPATIENT
Start: 2024-10-24

## 2024-10-24 RX ORDER — PANTOPRAZOLE SODIUM 40 MG/1
40 TABLET, DELAYED RELEASE ORAL DAILY
Qty: 90 TABLET | Refills: 0 | Status: SHIPPED | OUTPATIENT
Start: 2024-10-24

## 2024-10-24 ASSESSMENT — ENCOUNTER SYMPTOMS
EYE DISCHARGE: 0
SORE THROAT: 0
SINUS PAIN: 0
BLOOD IN STOOL: 0
SHORTNESS OF BREATH: 0
EYE PAIN: 0
ABDOMINAL PAIN: 0
NAUSEA: 0

## 2024-10-24 NOTE — PROGRESS NOTES
are normal.      Palpations: Abdomen is soft.   Musculoskeletal:         General: Normal range of motion.   Skin:     General: Skin is warm and dry.   Neurological:      Mental Status: He is alert and oriented to person, place, and time.   Psychiatric:         Behavior: Behavior normal.          Labs :    Lab Results   Component Value Date    WBC 8.7 07/10/2024    HGB 13.9 07/10/2024    HCT 42.6 07/10/2024     07/10/2024    CHOL 157 05/30/2017    TRIG 115 05/30/2017    HDL 32 (L) 07/10/2024    ALT 26 07/10/2024    AST 23 07/10/2024     07/10/2024    K 5.1 (H) 07/10/2024     07/10/2024    CREATININE 0.8 07/10/2024    BUN 13 07/10/2024    CO2 23 07/10/2024    TSH 1.98 07/10/2024    PSA 0.87 07/10/2024    GLUF 100 (H) 07/10/2024    LABA1C 5.6 05/30/2017     Lab Results   Component Value Date/Time    COLORU Yellow 07/10/2024 09:04 AM    NITRU NEGATIVE 07/10/2024 09:04 AM    GLUCOSEU NEGATIVE 07/10/2024 09:04 AM    GLUCOSEU NEGATIVE 12/16/2011 09:14 AM    KETUA NEGATIVE 07/10/2024 09:04 AM    UROBILINOGEN 0.2 07/10/2024 09:04 AM    BILIRUBINUR NEGATIVE 07/10/2024 09:04 AM     Lab Results   Component Value Date/Time    PSA 0.87 07/10/2024 09:04 AM             ASSESSMENT     Patient Active Problem List    Diagnosis Date Noted    Smoker 02/10/2023     Priority: Medium    S/P cervical spinal fusion 11/02/2020    Arthritis of lumbar spine 11/02/2020    Cervical herniated disc 02/14/2019    Herniation of cervical intervertebral disc     Myelopathy (HCC)     Spinal stenosis of lumbar region without neurogenic claudication 01/22/2019    Cauda equina compression (HCC) 01/20/2019    Other hyperlipidemia 03/16/2018    GERD (gastroesophageal reflux disease) 03/16/2018    Morbid obesity 03/16/2018    JEFFREY on CPAP 03/16/2018        Diagnosis:   1. Other hyperlipidemia  -     atorvastatin (LIPITOR) 20 MG tablet; Take 0.5 tablets by mouth daily, Disp-45 tablet, R-1Normal  2. Gastroesophageal reflux disease, unspecified

## 2025-02-06 ENCOUNTER — APPOINTMENT (OUTPATIENT)
Dept: GENERAL RADIOLOGY | Age: 55
End: 2025-02-06
Payer: COMMERCIAL

## 2025-02-06 ENCOUNTER — APPOINTMENT (OUTPATIENT)
Dept: CT IMAGING | Age: 55
End: 2025-02-06
Payer: COMMERCIAL

## 2025-02-06 ENCOUNTER — HOSPITAL ENCOUNTER (EMERGENCY)
Age: 55
Discharge: HOME | End: 2025-02-06
Attending: STUDENT IN AN ORGANIZED HEALTH CARE EDUCATION/TRAINING PROGRAM
Payer: COMMERCIAL

## 2025-02-06 VITALS
OXYGEN SATURATION: 98 % | HEART RATE: 63 BPM | BODY MASS INDEX: 58.54 KG/M2 | RESPIRATION RATE: 20 BRPM | DIASTOLIC BLOOD PRESSURE: 70 MMHG | TEMPERATURE: 97.6 F | WEIGHT: 315 LBS | SYSTOLIC BLOOD PRESSURE: 128 MMHG

## 2025-02-06 DIAGNOSIS — M54.12 CERVICAL RADICULOPATHY: Primary | ICD-10-CM

## 2025-02-06 DIAGNOSIS — R20.2 PARESTHESIAS: ICD-10-CM

## 2025-02-06 LAB
ALBUMIN SERPL-MCNC: 4.2 G/DL (ref 3.5–5.2)
ALP SERPL-CCNC: 69 U/L (ref 40–129)
ALT SERPL-CCNC: 28 U/L (ref 0–40)
ANION GAP SERPL CALCULATED.3IONS-SCNC: 10 MMOL/L (ref 7–16)
AST SERPL-CCNC: 24 U/L (ref 0–39)
BASOPHILS # BLD: 0.07 K/UL (ref 0–0.2)
BASOPHILS NFR BLD: 1 % (ref 0–2)
BILIRUB SERPL-MCNC: 0.3 MG/DL (ref 0–1.2)
BUN SERPL-MCNC: 13 MG/DL (ref 6–20)
CALCIUM SERPL-MCNC: 9.9 MG/DL (ref 8.6–10.2)
CHLORIDE SERPL-SCNC: 105 MMOL/L (ref 98–107)
CO2 SERPL-SCNC: 25 MMOL/L (ref 22–29)
CREAT SERPL-MCNC: 1 MG/DL (ref 0.7–1.2)
EOSINOPHIL # BLD: 0.23 K/UL (ref 0.05–0.5)
EOSINOPHILS RELATIVE PERCENT: 2 % (ref 0–6)
ERYTHROCYTE [DISTWIDTH] IN BLOOD BY AUTOMATED COUNT: 12 % (ref 11.5–15)
FLUAV RNA RESP QL NAA+PROBE: NOT DETECTED
FLUBV RNA RESP QL NAA+PROBE: NOT DETECTED
GFR, ESTIMATED: >90 ML/MIN/1.73M2
GLUCOSE SERPL-MCNC: 100 MG/DL (ref 74–99)
HCT VFR BLD AUTO: 41.8 % (ref 37–54)
HGB BLD-MCNC: 14.5 G/DL (ref 12.5–16.5)
IMM GRANULOCYTES # BLD AUTO: 0.04 K/UL (ref 0–0.58)
IMM GRANULOCYTES NFR BLD: 0 % (ref 0–5)
LYMPHOCYTES NFR BLD: 3.14 K/UL (ref 1.5–4)
LYMPHOCYTES RELATIVE PERCENT: 29 % (ref 20–42)
MCH RBC QN AUTO: 30.6 PG (ref 26–35)
MCHC RBC AUTO-ENTMCNC: 34.7 G/DL (ref 32–34.5)
MCV RBC AUTO: 88.2 FL (ref 80–99.9)
MONOCYTES NFR BLD: 0.79 K/UL (ref 0.1–0.95)
MONOCYTES NFR BLD: 7 % (ref 2–12)
NEUTROPHILS NFR BLD: 60 % (ref 43–80)
NEUTS SEG NFR BLD: 6.49 K/UL (ref 1.8–7.3)
PLATELET # BLD AUTO: 202 K/UL (ref 130–450)
PMV BLD AUTO: 10.8 FL (ref 7–12)
POTASSIUM SERPL-SCNC: 4.1 MMOL/L (ref 3.5–5)
PROT SERPL-MCNC: 7.5 G/DL (ref 6.4–8.3)
RBC # BLD AUTO: 4.74 M/UL (ref 3.8–5.8)
SARS-COV-2 RNA RESP QL NAA+PROBE: NOT DETECTED
SODIUM SERPL-SCNC: 140 MMOL/L (ref 132–146)
SOURCE: NORMAL
SPECIMEN DESCRIPTION: NORMAL
TROPONIN I SERPL HS-MCNC: 20 NG/L (ref 0–11)
TROPONIN I SERPL HS-MCNC: 21 NG/L (ref 0–11)
WBC OTHER # BLD: 10.8 K/UL (ref 4.5–11.5)

## 2025-02-06 PROCEDURE — 85025 COMPLETE CBC W/AUTO DIFF WBC: CPT

## 2025-02-06 PROCEDURE — 93005 ELECTROCARDIOGRAM TRACING: CPT

## 2025-02-06 PROCEDURE — 87636 SARSCOV2 & INF A&B AMP PRB: CPT

## 2025-02-06 PROCEDURE — 72125 CT NECK SPINE W/O DYE: CPT

## 2025-02-06 PROCEDURE — 96372 THER/PROPH/DIAG INJ SC/IM: CPT

## 2025-02-06 PROCEDURE — 99285 EMERGENCY DEPT VISIT HI MDM: CPT

## 2025-02-06 PROCEDURE — 71046 X-RAY EXAM CHEST 2 VIEWS: CPT

## 2025-02-06 PROCEDURE — 80053 COMPREHEN METABOLIC PANEL: CPT

## 2025-02-06 PROCEDURE — 70450 CT HEAD/BRAIN W/O DYE: CPT

## 2025-02-06 PROCEDURE — 84484 ASSAY OF TROPONIN QUANT: CPT

## 2025-02-06 PROCEDURE — 6360000002 HC RX W HCPCS

## 2025-02-06 RX ORDER — LIDOCAINE 4 G/G
1 PATCH TOPICAL DAILY
Qty: 5 PATCH | Refills: 0 | Status: SHIPPED | OUTPATIENT
Start: 2025-02-06 | End: 2025-02-11

## 2025-02-06 RX ORDER — ORPHENADRINE CITRATE 30 MG/ML
60 INJECTION INTRAMUSCULAR; INTRAVENOUS ONCE
Status: COMPLETED | OUTPATIENT
Start: 2025-02-06 | End: 2025-02-06

## 2025-02-06 RX ORDER — PREDNISONE 20 MG/1
40 TABLET ORAL DAILY
Qty: 10 TABLET | Refills: 0 | Status: SHIPPED | OUTPATIENT
Start: 2025-02-06 | End: 2025-02-11

## 2025-02-06 RX ORDER — METHOCARBAMOL 750 MG/1
750 TABLET, FILM COATED ORAL 4 TIMES DAILY
Qty: 20 TABLET | Refills: 0 | Status: SHIPPED | OUTPATIENT
Start: 2025-02-06 | End: 2025-02-11

## 2025-02-06 RX ADMIN — ORPHENADRINE CITRATE 60 MG: 60 INJECTION INTRAMUSCULAR; INTRAVENOUS at 17:23

## 2025-02-06 ASSESSMENT — LIFESTYLE VARIABLES
HOW OFTEN DO YOU HAVE A DRINK CONTAINING ALCOHOL: NEVER
HOW MANY STANDARD DRINKS CONTAINING ALCOHOL DO YOU HAVE ON A TYPICAL DAY: PATIENT DOES NOT DRINK

## 2025-02-06 NOTE — ED PROVIDER NOTES
[facility] EMERGENCY DEPARTMENT  EMERGENCY DEPARTMENT ENCOUNTER        Pt Name: oK Gutierrez II  MRN: [id number]  Birthdate 1970  Date of evaluation: 2/6/2025  Provider: Talat August DO  PCP: Scarlett Vo MD  Note Started: 4:55 PM EST 2/6/25    CHIEF COMPLAINT       Chief Complaint   Patient presents with    Numbness     Paresthesias to neck/left arm inter for years but last longer today since 0900 today, no other c/o        HISTORY OF PRESENT ILLNESS: 1 or more Elements   History From: Patient    Limitations to history : None    Ko Gutierrez II is a 54 y.o. male with past medical history of HLD, GERD, JEFFREY on CPAP, obesity, cauda equina compression, spinal stenosis of lumbar region, cervical herniated disc, cervical spinal fusion, arthritis who presents to the ED with numbness/tingling of the left arm for the past year worse since 9 AM today as well as a dull chest pain since 9 AM today.  He states that his symptoms have subsided since being in the ED.  Patient denies any fever, chills, headache, shortness of breath, abdominal pain, nausea, vomiting, diarrhea, lightheadedness, dysuria, hematuria, hematochezia, or melena.  Patient denies any recent travel or recent surgery.  Denies any injury/trauma.  Denies any other known inciting factors to his symptoms.      Nursing Notes were all reviewed and agreed with or any disagreements were addressed in the HPI.        REVIEW OF SYSTEMS :           Positives and Pertinent negatives as per HPI.     SURGICAL HISTORY     Past Surgical History:   Procedure Laterality Date    CERVICAL FUSION N/A 02/14/2019    C5-C6  ANTERIOR CERVICAL DISCECTOMY AND FUSION -- NEEDS PLATES, SCREWS, REGULAR BED WITH HORSE SHOE - GLOBUS performed by Azael Horne MD at [facility] [place]    COLONOSCOPY      COLONOSCOPY N/A 10/2/2024    COLONOSCOPY POLYPECTOMY COLD SNARE performed by Salinas Walden MD at [facility] ENDOSCOPY    UPPER GASTROINTESTINAL ENDOSCOPY N/A

## 2025-02-07 NOTE — DISCHARGE INSTRUCTIONS
Please follow-up with your PCP and with your neurosurgeon.  Please return to the ED if symptoms worsen or new symptoms arise.  Thank you.

## 2025-02-08 LAB
EKG ATRIAL RATE: 75 BPM
EKG P AXIS: 30 DEGREES
EKG P-R INTERVAL: 148 MS
EKG Q-T INTERVAL: 380 MS
EKG QRS DURATION: 92 MS
EKG QTC CALCULATION (BAZETT): 424 MS
EKG R AXIS: 12 DEGREES
EKG T AXIS: 50 DEGREES
EKG VENTRICULAR RATE: 75 BPM

## 2025-02-08 PROCEDURE — 93010 ELECTROCARDIOGRAM REPORT: CPT | Performed by: INTERNAL MEDICINE

## 2025-02-11 ENCOUNTER — OFFICE VISIT (OUTPATIENT)
Dept: FAMILY MEDICINE CLINIC | Age: 55
End: 2025-02-11
Payer: COMMERCIAL

## 2025-02-11 VITALS
HEIGHT: 68 IN | HEART RATE: 80 BPM | BODY MASS INDEX: 47.74 KG/M2 | DIASTOLIC BLOOD PRESSURE: 74 MMHG | TEMPERATURE: 98.2 F | OXYGEN SATURATION: 97 % | SYSTOLIC BLOOD PRESSURE: 136 MMHG | WEIGHT: 315 LBS

## 2025-02-11 DIAGNOSIS — G47.33 OSA ON CPAP: ICD-10-CM

## 2025-02-11 DIAGNOSIS — K21.9 GASTROESOPHAGEAL REFLUX DISEASE, UNSPECIFIED WHETHER ESOPHAGITIS PRESENT: ICD-10-CM

## 2025-02-11 DIAGNOSIS — R94.31 ABNORMAL EKG: ICD-10-CM

## 2025-02-11 DIAGNOSIS — F17.200 SMOKER: ICD-10-CM

## 2025-02-11 DIAGNOSIS — E66.813 CLASS 3 SEVERE OBESITY WITH SERIOUS COMORBIDITY AND BODY MASS INDEX (BMI) OF 50.0 TO 59.9 IN ADULT, UNSPECIFIED OBESITY TYPE: ICD-10-CM

## 2025-02-11 DIAGNOSIS — Z98.1 S/P CERVICAL SPINAL FUSION: ICD-10-CM

## 2025-02-11 DIAGNOSIS — E78.49 OTHER HYPERLIPIDEMIA: ICD-10-CM

## 2025-02-11 DIAGNOSIS — E66.01 CLASS 3 SEVERE OBESITY WITH SERIOUS COMORBIDITY AND BODY MASS INDEX (BMI) OF 50.0 TO 59.9 IN ADULT, UNSPECIFIED OBESITY TYPE: ICD-10-CM

## 2025-02-11 DIAGNOSIS — M54.12 CERVICAL RADICULOPATHY: Primary | ICD-10-CM

## 2025-02-11 PROCEDURE — 4004F PT TOBACCO SCREEN RCVD TLK: CPT | Performed by: INTERNAL MEDICINE

## 2025-02-11 PROCEDURE — 99214 OFFICE O/P EST MOD 30 MIN: CPT | Performed by: INTERNAL MEDICINE

## 2025-02-11 PROCEDURE — 3017F COLORECTAL CA SCREEN DOC REV: CPT | Performed by: INTERNAL MEDICINE

## 2025-02-11 PROCEDURE — G8427 DOCREV CUR MEDS BY ELIG CLIN: HCPCS | Performed by: INTERNAL MEDICINE

## 2025-02-11 PROCEDURE — G8417 CALC BMI ABV UP PARAM F/U: HCPCS | Performed by: INTERNAL MEDICINE

## 2025-02-11 SDOH — ECONOMIC STABILITY: INCOME INSECURITY: IN THE LAST 12 MONTHS, WAS THERE A TIME WHEN YOU WERE NOT ABLE TO PAY THE MORTGAGE OR RENT ON TIME?: NO

## 2025-02-11 SDOH — ECONOMIC STABILITY: FOOD INSECURITY: WITHIN THE PAST 12 MONTHS, YOU WORRIED THAT YOUR FOOD WOULD RUN OUT BEFORE YOU GOT MONEY TO BUY MORE.: NEVER TRUE

## 2025-02-11 SDOH — ECONOMIC STABILITY: FOOD INSECURITY: WITHIN THE PAST 12 MONTHS, THE FOOD YOU BOUGHT JUST DIDN'T LAST AND YOU DIDN'T HAVE MONEY TO GET MORE.: NEVER TRUE

## 2025-02-11 ASSESSMENT — PATIENT HEALTH QUESTIONNAIRE - PHQ9
1. LITTLE INTEREST OR PLEASURE IN DOING THINGS: NOT AT ALL
2. FEELING DOWN, DEPRESSED OR HOPELESS: NOT AT ALL
SUM OF ALL RESPONSES TO PHQ QUESTIONS 1-9: 0
2. FEELING DOWN, DEPRESSED OR HOPELESS: NOT AT ALL
SUM OF ALL RESPONSES TO PHQ9 QUESTIONS 1 & 2: 0
SUM OF ALL RESPONSES TO PHQ9 QUESTIONS 1 & 2: 0
SUM OF ALL RESPONSES TO PHQ QUESTIONS 1-9: 0
SUM OF ALL RESPONSES TO PHQ QUESTIONS 1-9: 0
1. LITTLE INTEREST OR PLEASURE IN DOING THINGS: NOT AT ALL
SUM OF ALL RESPONSES TO PHQ QUESTIONS 1-9: 0

## 2025-02-11 ASSESSMENT — ENCOUNTER SYMPTOMS
EYE DISCHARGE: 0
ABDOMINAL PAIN: 0
BLOOD IN STOOL: 0
EYE PAIN: 0
NAUSEA: 0
SHORTNESS OF BREATH: 0
SINUS PAIN: 0
SORE THROAT: 0

## 2025-02-11 NOTE — PROGRESS NOTES
Chief Complaint   Patient presents with    Numbness     Follow up from ED on 02/06/2025 for Cervical Radiculopathy and numbness of left arm.         HPI:  Patient is here for follow-up     Was in ER for cervical radiculopathy    Symptomatically better       Allergy and Medications are reviewed and updated.  Past Medical History, Surgical History, and Family History has been reviewed and updated.    Review of Systems:  Review of Systems   Constitutional:  Negative for chills and fever.   HENT:  Negative for congestion, sinus pain and sore throat.    Eyes:  Negative for pain and discharge.   Respiratory:  Negative for shortness of breath (No new SOb).    Cardiovascular:  Negative for chest pain.   Gastrointestinal:  Negative for abdominal pain, blood in stool and nausea.   Genitourinary:  Negative for flank pain and frequency.   Musculoskeletal:  Negative for neck pain.   Hematological:  Does not bruise/bleed easily.   Psychiatric/Behavioral:  Negative for suicidal ideas.          Vitals:    02/11/25 1033   BP: 136/74   Position: Sitting   Pulse: 80   Temp: 98.2 °F (36.8 °C)   TempSrc: Temporal   SpO2: 97%   Weight: (!) 175 kg (385 lb 14.4 oz)   Height: 1.727 m (5' 8\")       Physical Exam  Vitals reviewed.   Constitutional:       Appearance: He is well-developed.   HENT:      Head: Normocephalic and atraumatic.   Eyes:      Conjunctiva/sclera: Conjunctivae normal.      Pupils: Pupils are equal, round, and reactive to light.   Neck:      Vascular: No JVD.   Cardiovascular:      Rate and Rhythm: Normal rate and regular rhythm.   Pulmonary:      Effort: Pulmonary effort is normal.      Breath sounds: Normal breath sounds.   Abdominal:      General: Bowel sounds are normal.      Palpations: Abdomen is soft.   Musculoskeletal:         General: Normal range of motion.   Skin:     General: Skin is warm and dry.   Neurological:      Mental Status: He is alert and oriented to person, place, and time.   Psychiatric:

## 2025-02-19 ENCOUNTER — TELEPHONE (OUTPATIENT)
Dept: BARIATRICS/WEIGHT MGMT | Age: 55
End: 2025-02-19

## 2025-03-19 ENCOUNTER — TELEPHONE (OUTPATIENT)
Dept: CARDIOLOGY CLINIC | Age: 55
End: 2025-03-19

## (undated) DEVICE — TOWEL,OR,DSP,ST,BLUE,STD,6/PK,12PK/CS: Brand: MEDLINE

## (undated) DEVICE — WIPES SKIN CLOTH READYPREP 9 X 10.5 IN 2% CHLORHEX GLUCONATE CHG PREOP

## (undated) DEVICE — GLOVE SURG SZ 85 STD WHT LTX SYN POLYMER BEAD REINF ANTI RL

## (undated) DEVICE — NEEDLE SPNL L3.5IN PNK HUB S STL REG WALL FIT STYL W/ QNCKE

## (undated) DEVICE — DOUBLE BASIN SET: Brand: MEDLINE INDUSTRIES, INC.

## (undated) DEVICE — SOLUTION IV IRRIG WATER 1000ML POUR BRL 2F7114

## (undated) DEVICE — FORCEPS BX L240CM JAW DIA2.8MM L CAP W/ NDL MIC MESH TOOTH

## (undated) DEVICE — AIR/WATER CLEANING ADAPTER FOR OLYMPUS® GI ENDOSCOPE: Brand: BULLDOG®

## (undated) DEVICE — JELLY,LUBE,STERILE,FLIP TOP,TUBE,4-OZ: Brand: MEDLINE

## (undated) DEVICE — TOWEL,OR,DSP,ST,BLUE,DLX,10/PK,8PK/CS: Brand: MEDLINE

## (undated) DEVICE — BAG SPECIMEN BIOHAZARD 6IN X 9IN

## (undated) DEVICE — 1.5L THIN WALL CAN: Brand: CRD

## (undated) DEVICE — SPONGE SURG WHT KTNR DISECT RADPQ ST

## (undated) DEVICE — CODMAN® SURGICAL PATTIES 1/2" X 1" (1.27CM X 2.54CM): Brand: CODMAN®

## (undated) DEVICE — Z CONVERTED USE 2275207 CLOTH PREP W7.5XL7.5IN 2% CHG SKIN ALC AND RNS FREE

## (undated) DEVICE — COVER,TABLE,60X90,STERILE: Brand: MEDLINE

## (undated) DEVICE — STANDARD HYPODERMIC NEEDLE,POLYPROPYLENE HUB: Brand: MONOJECT

## (undated) DEVICE — CODMAN® SURGICAL PATTIES 1/2" X 1/2" (1.27CM X 1.27CM): Brand: CODMAN®

## (undated) DEVICE — SET NEURO BLK BELT MICRO DISCECTOMY

## (undated) DEVICE — SOLUTION IV IRRIG POUR BRL 0.9% SODIUM CHL 2F7124

## (undated) DEVICE — BANDAGE,GAUZE,4.5"X4.1YD,STERILE,LF: Brand: MEDLINE

## (undated) DEVICE — E-Z CLEAN, NON-STICK, PTFE COATED, ELECTROSURGICAL BLADE ELECTRODE, MODIFIED EXTENDED INSULATION, 2.5 INCH (6.35 CM): Brand: MEGADYNE

## (undated) DEVICE — SPONGE GZ 4IN 4IN 4 PLY N WVN AVANT

## (undated) DEVICE — CONTAINER SPEC COLL 960ML POLYPR TRIANG GRAD INTAKE/OUTPUT

## (undated) DEVICE — BLADE CLIPPER GEN PURP NS

## (undated) DEVICE — SWABSTICK SURG PREP BENZOIN TINCTURE SINGLE ST

## (undated) DEVICE — SHEET, T, LAPAROTOMY, STERILE: Brand: MEDLINE

## (undated) DEVICE — READY WET SKIN SCRUB TRAY-LF: Brand: MEDLINE INDUSTRIES, INC.

## (undated) DEVICE — SKIN AFFIX SURG ADHESIVE 72/CS 0.55ML: Brand: MEDLINE

## (undated) DEVICE — YANKAUER,BULB TIP,W/O VENT,RIGID,STERILE: Brand: MEDLINE

## (undated) DEVICE — SPONGE,DISSECTOR,ROUND CHERRY,XR,ST,5/PK: Brand: MEDLINE

## (undated) DEVICE — SURGICAL PROCEDURE PACK LAMINECTOMY LUMBAR

## (undated) DEVICE — SET NEURO BLKBELT RETRACTOR

## (undated) DEVICE — CASPAR DISTR PIN12MMSTER: Brand: AESCULAP

## (undated) DEVICE — TUBING, SUCTION, 1/4" X 10', STRAIGHT: Brand: MEDLINE

## (undated) DEVICE — 3M™ IOBAN™ 2 ANTIMICROBIAL INCISE DRAPE 6640EZ: Brand: IOBAN™ 2

## (undated) DEVICE — COVER,LIGHT HANDLE,FLX,1/PK: Brand: MEDLINE INDUSTRIES, INC.

## (undated) DEVICE — GLOVE ORANGE PI 8 1/2   MSG9085

## (undated) DEVICE — GLOVE ORANGE PI 8   MSG9080

## (undated) DEVICE — GOWN,SIRUS,FABRNF,XL,20/CS: Brand: MEDLINE

## (undated) DEVICE — GLOVE ORANGE PI 7   MSG9070

## (undated) DEVICE — TUBING, SUCTION, 3/16" X 12', STRAIGHT: Brand: MEDLINE

## (undated) DEVICE — 3M(TM) MEDIPORE(TM) +PAD SOFT CLOTH ADHESIVE WOUND DRESSING 3569: Brand: 3M™ MEDIPORE™

## (undated) DEVICE — GRADUATE

## (undated) DEVICE — 4-PORT MANIFOLD: Brand: NEPTUNE 2

## (undated) DEVICE — GAUZE,SPONGE,4"X4",16PLY,XRAY,STRL,LF: Brand: MEDLINE

## (undated) DEVICE — INSTRUMENT EXTRAS ACF SINGLE ORANGE OR W

## (undated) DEVICE — SINGLE-USE POLYPECTOMY SNARE: Brand: CAPTIVATOR

## (undated) DEVICE — KIT BEDSIDE REVITAL OX 500ML

## (undated) DEVICE — INTENDED FOR TISSUE SEPARATION, AND OTHER PROCEDURES THAT REQUIRE A SHARP SURGICAL BLADE TO PUNCTURE OR CUT.: Brand: BARD-PARKER ® STAINLESS STEEL BLADES

## (undated) DEVICE — LABEL MED 4 IN SURG PANEL W/ PEN STRL

## (undated) DEVICE — Device

## (undated) DEVICE — EXTRAS UGOKWE

## (undated) DEVICE — KENDALL 450 SERIES MONITORING FOAM ELECTRODE - RECTANGULAR SHAPE ( 3/PK): Brand: KENDALL

## (undated) DEVICE — BLOCK BITE 60FR CAREGUARD

## (undated) DEVICE — TUBING SUCT 12FR MAL ALUM SHFT FN CAP VENT UNIV CONN W/ OBT

## (undated) DEVICE — DRILL SYSTEM 7

## (undated) DEVICE — SET SPINAL NEURO STNEU1

## (undated) DEVICE — 3.0MM PRECISION NEURO (MATCH HEAD)

## (undated) DEVICE — GLOVE SURG SZ 65 THK91MIL LTX FREE SYN POLYISOPRENE

## (undated) DEVICE — Device: Brand: DEFENDO VALVE AND CONNECTOR KIT

## (undated) DEVICE — TRAP SURG QUAD PARABOLA SLOT DSGN SFTY SCRN TRAPEASE